# Patient Record
Sex: FEMALE | Race: BLACK OR AFRICAN AMERICAN | NOT HISPANIC OR LATINO | Employment: FULL TIME | URBAN - METROPOLITAN AREA
[De-identification: names, ages, dates, MRNs, and addresses within clinical notes are randomized per-mention and may not be internally consistent; named-entity substitution may affect disease eponyms.]

---

## 2020-09-01 ENCOUNTER — HOSPITAL ENCOUNTER (OUTPATIENT)
Dept: URGENT CARE | Facility: CLINIC | Age: 35
Discharge: HOME | End: 2020-09-01
Attending: FAMILY MEDICINE

## 2020-09-03 LAB — BACTERIA SPEC AEROBE CULT: NORMAL

## 2020-09-04 LAB — SARS-COV-2 RNA SPEC QL NAA+PROBE: NOT DETECTED

## 2021-01-18 ENCOUNTER — OFFICE VISIT CONVERTED (OUTPATIENT)
Dept: ORTHOPEDIC SURGERY | Facility: CLINIC | Age: 36
End: 2021-01-18
Attending: ORTHOPAEDIC SURGERY

## 2021-02-13 ENCOUNTER — HOSPITAL ENCOUNTER (OUTPATIENT)
Dept: URGENT CARE | Facility: CLINIC | Age: 36
Discharge: HOME | End: 2021-02-13
Attending: EMERGENCY MEDICINE
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
## 2021-05-14 VITALS — HEART RATE: 109 BPM | BODY MASS INDEX: 45.55 KG/M2 | OXYGEN SATURATION: 99 % | HEIGHT: 61 IN | WEIGHT: 241.25 LBS

## 2021-12-19 ENCOUNTER — HOSPITAL ENCOUNTER (EMERGENCY)
Facility: HOSPITAL | Age: 36
Discharge: HOME | End: 2021-12-19
Attending: EMERGENCY MEDICINE | Admitting: EMERGENCY MEDICINE

## 2021-12-19 VITALS
WEIGHT: 230.82 LBS | HEIGHT: 62 IN | RESPIRATION RATE: 19 BRPM | DIASTOLIC BLOOD PRESSURE: 97 MMHG | OXYGEN SATURATION: 99 % | BODY MASS INDEX: 42.48 KG/M2 | SYSTOLIC BLOOD PRESSURE: 128 MMHG | TEMPERATURE: 98.1 F | HEART RATE: 87 BPM

## 2021-12-19 DIAGNOSIS — S61.208A: Primary | ICD-10-CM

## 2021-12-19 PROCEDURE — 90471 IMMUNIZATION ADMIN: CPT | Performed by: NURSE PRACTITIONER

## 2021-12-19 PROCEDURE — 99283 EMERGENCY DEPT VISIT LOW MDM: CPT

## 2021-12-19 PROCEDURE — 90715 TDAP VACCINE 7 YRS/> IM: CPT | Performed by: NURSE PRACTITIONER

## 2021-12-19 PROCEDURE — 25010000002 TETANUS-DIPHTH-ACELL PERTUSSIS 5-2.5-18.5 LF-MCG/0.5 SUSPENSION PREFILLED SYRINGE: Performed by: NURSE PRACTITIONER

## 2021-12-19 RX ORDER — GINSENG 100 MG
CAPSULE ORAL ONCE
Status: COMPLETED | OUTPATIENT
Start: 2021-12-19 | End: 2021-12-19

## 2021-12-19 RX ADMIN — TETANUS TOXOID, REDUCED DIPHTHERIA TOXOID AND ACELLULAR PERTUSSIS VACCINE, ADSORBED 0.5 ML: 5; 2.5; 8; 8; 2.5 SUSPENSION INTRAMUSCULAR at 12:03

## 2021-12-19 RX ADMIN — Medication: at 12:27

## 2021-12-19 NOTE — ED PROVIDER NOTES
Subjective   Patient complaining of avulsion to the lateral side of her right index finger starting today.  Patient states she was utilizing a  at work when she accidentally cut her finger.  Patient denies any additional symptoms and or concerns at this time.      History provided by:  Patient   used: No    Laceration  Location:  Finger  Finger laceration location:  R index finger  Length:  Approximately 1 cm  Depth:  Cutaneous  Quality: avulsion    Bleeding: controlled    Time since incident: Today.  Laceration mechanism:  Knife (Patient states was utilizing a  when she cut her finger.)  Pain details:     Quality:  Dull    Timing:  Constant    Progression:  Unchanged  Foreign body present:  No foreign bodies  Relieved by:  Nothing  Worsened by:  Nothing  Ineffective treatments:  None tried  Tetanus status:  Out of date  Associated symptoms: no fever, no focal weakness, no numbness, no rash, no redness, no swelling and no streaking        Review of Systems   Constitutional: Negative for chills and fever.   HENT: Negative for congestion, ear pain and sore throat.    Eyes: Negative for pain.   Respiratory: Negative for cough, chest tightness and shortness of breath.    Cardiovascular: Negative for chest pain.   Gastrointestinal: Negative for abdominal pain, diarrhea, nausea and vomiting.   Genitourinary: Negative for flank pain and hematuria.   Musculoskeletal: Negative for joint swelling.        Patient complaining of avulsion to side of her right index finger starting today.  Patient was utilizing a  when she cut her finger.   Skin: Negative for pallor and rash.   Neurological: Negative for focal weakness, seizures and headaches.   Psychiatric/Behavioral: Negative.    All other systems reviewed and are negative.      Past Medical History:   Diagnosis Date   • Anemia    • Asthma    • Claustrophobia        No Known Allergies    History reviewed. No pertinent  surgical history.    Family History   Problem Relation Age of Onset   • Diabetes Mother    • Cancer Mother        Social History     Socioeconomic History   • Marital status: Single   Tobacco Use   • Smoking status: Current Every Day Smoker     Packs/day: 1.00   • Smokeless tobacco: Never Used   • Tobacco comment: SMOKED 5 YEARS   Substance and Sexual Activity   • Alcohol use: Yes     Comment: OCCASIONALLY   • Drug use: Never           Objective   Physical Exam  Vitals and nursing note reviewed.   Constitutional:       General: She is not in acute distress.     Appearance: Normal appearance. She is normal weight. She is not toxic-appearing.   HENT:      Head: Normocephalic and atraumatic.      Mouth/Throat:      Mouth: Mucous membranes are moist.   Eyes:      General: No scleral icterus.  Cardiovascular:      Rate and Rhythm: Normal rate and regular rhythm.      Pulses: Normal pulses.      Heart sounds: Normal heart sounds.   Pulmonary:      Effort: Pulmonary effort is normal. No respiratory distress.      Breath sounds: Normal breath sounds.   Abdominal:      General: Abdomen is flat.      Palpations: Abdomen is soft.      Tenderness: There is no abdominal tenderness.   Musculoskeletal:         General: Normal range of motion.        Arms:       Cervical back: Normal range of motion and neck supple.   Skin:     General: Skin is warm and dry.   Neurological:      General: No focal deficit present.      Mental Status: She is alert and oriented to person, place, and time. Mental status is at baseline.   Psychiatric:         Mood and Affect: Mood normal.         Behavior: Behavior normal.         Thought Content: Thought content normal.         Judgment: Judgment normal.         Procedures           ED Course                                                 MDM  Number of Diagnoses or Management Options  Diagnosis management comments: I have spoken with Ms. Dunne. I have explained the patient´s condition, diagnoses  and treatment plan based on the information available to me at this time. I have answered the patient questions and addressed any concerns. The patient has a good  understanding of the patient´s diagnosis, condition, and treatment plan as can be expected at this point. The vital signs have been stable. The patient´s condition is stable and appropriate for discharge from the emergency department.      The patient will pursue further outpatient evaluation with the primary care physician or other designated or consulting physician as outlined in the discharge instructions. They are agreeable to this plan of care and follow-up instructions have been explained in detail. The patient has received these instructions in written format and have expressed an understanding of the discharge instructions. The patient is aware that any significant change in condition or worsening of symptoms should prompt an immediate return to this or the closest emergency department or call to 1.    Risk of Complications, Morbidity, and/or Mortality  Presenting problems: low  Diagnostic procedures: low  Management options: low    Patient Progress  Patient progress: stable      Final diagnoses:   Avulsion of skin of index finger without complication, initial encounter       ED Disposition  ED Disposition     ED Disposition Condition Comment    Discharge Stable           [facility]  [address] [facility] [address]  [facility] [address]  [phone]  Schedule an appointment as soon as possible for a visit in 3 days           Medication List      New Prescriptions    diclofenac 50 MG EC tablet  Commonly known as: VOLTAREN  Take 1 tablet by mouth 3 (Three) Times a Day.           Where to Get Your Medications      These medications were sent to [facility]/pharmacy #[id number] - [place], [place] - [address] - [phone] [facility] [phone] FX  [address] [place] [address]    Hours: 24-hours Phone: [phone]    · diclofenac 50 MG EC tablet          Royal Brown, APRN  12/19/21 1400

## 2023-10-02 ENCOUNTER — HOSPITAL ENCOUNTER (INPATIENT)
Facility: HOSPITAL | Age: 38
LOS: 1 days | Discharge: HOME | DRG: 812 | End: 2023-10-03
Attending: EMERGENCY MEDICINE
Payer: MEDICAID

## 2023-10-02 ENCOUNTER — APPOINTMENT (OUTPATIENT)
Dept: GENERAL RADIOLOGY | Facility: HOSPITAL | Age: 38
DRG: 812 | End: 2023-10-02
Payer: MEDICAID

## 2023-10-02 ENCOUNTER — APPOINTMENT (OUTPATIENT)
Dept: ULTRASOUND IMAGING | Facility: HOSPITAL | Age: 38
DRG: 812 | End: 2023-10-02
Payer: MEDICAID

## 2023-10-02 DIAGNOSIS — N93.9 VAGINAL BLEEDING: Primary | ICD-10-CM

## 2023-10-02 DIAGNOSIS — D64.9 SYMPTOMATIC ANEMIA: ICD-10-CM

## 2023-10-02 LAB
ABO GROUP BLD: NORMAL
ABO GROUP BLD: NORMAL
ALBUMIN SERPL-MCNC: 4 G/DL (ref 3.5–5.2)
ALBUMIN/GLOB SERPL: 1.2 G/DL
ALP SERPL-CCNC: 77 U/L (ref 39–117)
ALT SERPL W P-5'-P-CCNC: 11 U/L (ref 1–33)
ANION GAP SERPL CALCULATED.3IONS-SCNC: 10.3 MMOL/L (ref 5–15)
ANISOCYTOSIS BLD QL: NORMAL
AST SERPL-CCNC: 12 U/L (ref 1–32)
BASOPHILS # BLD AUTO: 0.03 10*3/MM3 (ref 0–0.2)
BASOPHILS NFR BLD AUTO: 0.3 % (ref 0–1.5)
BILIRUB SERPL-MCNC: 0.3 MG/DL (ref 0–1.2)
BLD GP AB SCN SERPL QL: NEGATIVE
BUN SERPL-MCNC: 12 MG/DL (ref 6–20)
BUN/CREAT SERPL: 14 (ref 7–25)
CALCIUM SPEC-SCNC: 9.1 MG/DL (ref 8.6–10.5)
CHLORIDE SERPL-SCNC: 101 MMOL/L (ref 98–107)
CO2 SERPL-SCNC: 23.7 MMOL/L (ref 22–29)
CREAT SERPL-MCNC: 0.86 MG/DL (ref 0.57–1)
DEPRECATED RDW RBC AUTO: 43.6 FL (ref 37–54)
EGFRCR SERPLBLD CKD-EPI 2021: 89.9 ML/MIN/1.73
EOSINOPHIL # BLD AUTO: 0.13 10*3/MM3 (ref 0–0.4)
EOSINOPHIL NFR BLD AUTO: 1.4 % (ref 0.3–6.2)
ERYTHROCYTE [DISTWIDTH] IN BLOOD BY AUTOMATED COUNT: 20.9 % (ref 12.3–15.4)
GLOBULIN UR ELPH-MCNC: 3.4 GM/DL
GLUCOSE SERPL-MCNC: 94 MG/DL (ref 65–99)
HCG SERPL QL: NEGATIVE
HCT VFR BLD AUTO: 19.9 % (ref 34–46.6)
HGB BLD-MCNC: 5.3 G/DL (ref 12–15.9)
HOLD SPECIMEN: NORMAL
HOLD SPECIMEN: NORMAL
HYPOCHROMIA BLD QL: NORMAL
IMM GRANULOCYTES # BLD AUTO: 0.04 10*3/MM3 (ref 0–0.05)
IMM GRANULOCYTES NFR BLD AUTO: 0.4 % (ref 0–0.5)
LYMPHOCYTES # BLD AUTO: 1.9 10*3/MM3 (ref 0.7–3.1)
LYMPHOCYTES NFR BLD AUTO: 20.4 % (ref 19.6–45.3)
MCH RBC QN AUTO: 16.1 PG (ref 26.6–33)
MCHC RBC AUTO-ENTMCNC: 26.6 G/DL (ref 31.5–35.7)
MCV RBC AUTO: 60.3 FL (ref 79–97)
MICROCYTES BLD QL: NORMAL
MONOCYTES # BLD AUTO: 0.75 10*3/MM3 (ref 0.1–0.9)
MONOCYTES NFR BLD AUTO: 8.1 % (ref 5–12)
NEUTROPHILS NFR BLD AUTO: 6.46 10*3/MM3 (ref 1.7–7)
NEUTROPHILS NFR BLD AUTO: 69.4 % (ref 42.7–76)
NRBC BLD AUTO-RTO: 0.5 /100 WBC (ref 0–0.2)
OVALOCYTES BLD QL SMEAR: NORMAL
PLATELET # BLD AUTO: 610 10*3/MM3 (ref 140–450)
PMV BLD AUTO: 9.3 FL (ref 6–12)
POIKILOCYTOSIS BLD QL SMEAR: NORMAL
POTASSIUM SERPL-SCNC: 3.5 MMOL/L (ref 3.5–5.2)
PROT SERPL-MCNC: 7.4 G/DL (ref 6–8.5)
RBC # BLD AUTO: 3.3 10*6/MM3 (ref 3.77–5.28)
RH BLD: POSITIVE
RH BLD: POSITIVE
SMALL PLATELETS BLD QL SMEAR: NORMAL
SODIUM SERPL-SCNC: 135 MMOL/L (ref 136–145)
T&S EXPIRATION DATE: NORMAL
TARGETS BLD QL SMEAR: NORMAL
WBC MORPH BLD: NORMAL
WBC NRBC COR # BLD: 9.31 10*3/MM3 (ref 3.4–10.8)
WHOLE BLOOD HOLD COAG: NORMAL
WHOLE BLOOD HOLD SPECIMEN: NORMAL

## 2023-10-02 PROCEDURE — 36415 COLL VENOUS BLD VENIPUNCTURE: CPT

## 2023-10-02 PROCEDURE — 85007 BL SMEAR W/DIFF WBC COUNT: CPT

## 2023-10-02 PROCEDURE — 86850 RBC ANTIBODY SCREEN: CPT | Performed by: EMERGENCY MEDICINE

## 2023-10-02 PROCEDURE — 84703 CHORIONIC GONADOTROPIN ASSAY: CPT | Performed by: EMERGENCY MEDICINE

## 2023-10-02 PROCEDURE — 99285 EMERGENCY DEPT VISIT HI MDM: CPT

## 2023-10-02 PROCEDURE — 99222 1ST HOSP IP/OBS MODERATE 55: CPT | Performed by: FAMILY MEDICINE

## 2023-10-02 PROCEDURE — 76830 TRANSVAGINAL US NON-OB: CPT

## 2023-10-02 PROCEDURE — 86901 BLOOD TYPING SEROLOGIC RH(D): CPT

## 2023-10-02 PROCEDURE — 86901 BLOOD TYPING SEROLOGIC RH(D): CPT | Performed by: EMERGENCY MEDICINE

## 2023-10-02 PROCEDURE — 86900 BLOOD TYPING SEROLOGIC ABO: CPT | Performed by: EMERGENCY MEDICINE

## 2023-10-02 PROCEDURE — 86900 BLOOD TYPING SEROLOGIC ABO: CPT

## 2023-10-02 PROCEDURE — 71045 X-RAY EXAM CHEST 1 VIEW: CPT

## 2023-10-02 PROCEDURE — 85025 COMPLETE CBC W/AUTO DIFF WBC: CPT

## 2023-10-02 PROCEDURE — 36430 TRANSFUSION BLD/BLD COMPNT: CPT

## 2023-10-02 PROCEDURE — P9016 RBC LEUKOCYTES REDUCED: HCPCS

## 2023-10-02 PROCEDURE — 86923 COMPATIBILITY TEST ELECTRIC: CPT

## 2023-10-02 PROCEDURE — 80053 COMPREHEN METABOLIC PANEL: CPT

## 2023-10-02 RX ORDER — NITROGLYCERIN 0.4 MG/1
0.4 TABLET SUBLINGUAL
Status: DISCONTINUED | OUTPATIENT
Start: 2023-10-02 | End: 2023-10-03 | Stop reason: HOSPADM

## 2023-10-02 RX ORDER — SODIUM CHLORIDE 0.9 % (FLUSH) 0.9 %
10 SYRINGE (ML) INJECTION EVERY 12 HOURS SCHEDULED
Status: DISCONTINUED | OUTPATIENT
Start: 2023-10-03 | End: 2023-10-03 | Stop reason: HOSPADM

## 2023-10-02 RX ORDER — SODIUM CHLORIDE 0.9 % (FLUSH) 0.9 %
10 SYRINGE (ML) INJECTION AS NEEDED
Status: DISCONTINUED | OUTPATIENT
Start: 2023-10-02 | End: 2023-10-03 | Stop reason: HOSPADM

## 2023-10-02 RX ORDER — POLYETHYLENE GLYCOL 3350 17 G/17G
17 POWDER, FOR SOLUTION ORAL DAILY PRN
Status: DISCONTINUED | OUTPATIENT
Start: 2023-10-02 | End: 2023-10-03 | Stop reason: HOSPADM

## 2023-10-02 RX ORDER — BISACODYL 5 MG/1
5 TABLET, DELAYED RELEASE ORAL DAILY PRN
Status: DISCONTINUED | OUTPATIENT
Start: 2023-10-02 | End: 2023-10-03 | Stop reason: HOSPADM

## 2023-10-02 RX ORDER — AMOXICILLIN 250 MG
2 CAPSULE ORAL 2 TIMES DAILY
Status: DISCONTINUED | OUTPATIENT
Start: 2023-10-03 | End: 2023-10-03 | Stop reason: HOSPADM

## 2023-10-02 RX ORDER — SODIUM CHLORIDE 9 MG/ML
40 INJECTION, SOLUTION INTRAVENOUS AS NEEDED
Status: DISCONTINUED | OUTPATIENT
Start: 2023-10-02 | End: 2023-10-03 | Stop reason: HOSPADM

## 2023-10-02 RX ORDER — BISACODYL 10 MG
10 SUPPOSITORY, RECTAL RECTAL DAILY PRN
Status: DISCONTINUED | OUTPATIENT
Start: 2023-10-02 | End: 2023-10-03 | Stop reason: HOSPADM

## 2023-10-02 RX ADMIN — Medication 10 ML: at 23:30

## 2023-10-02 NOTE — ED NOTES
Pt states that she has been experiencing lightheadedness for several months. Pt denies HA, N/V, and chest pain. Pt reports SOB. Pt states her menstrual cycle has been constant and heavy since June 8

## 2023-10-02 NOTE — ED PROVIDER NOTES
"Time: 3:35 PM EDT  Date of encounter:  10/2/2023  Independent Historian/Clinical History and Information was obtained by:   Patient    History is limited by: N/A    Chief Complaint   Patient presents with    Dizziness    Syncope    Vaginal Bleeding    Shortness of Breath         History of Present Illness:  Patient is a 38 y.o. year old female who presents to the emergency department for evaluation of menorrhagia.  Episodes of dizziness and syncope.  Patient has a history of anemia.    Patient is a 38-year-old female who presents with complaints of lightheaded, shortness of breath, fatigue, syncope.  Reports that she is gotten worse over the last few weeks.  Has had a ongoing menstrual cycle since June.  Has not seen OB/GYN for this.  States she she has heavy bleeding and clots.  Denies any chest pain, nausea, vomiting.  No other complaints this time.    HPI    Patient Care Team  Primary Care Provider: Provider, No Known    Past Medical History:     No Known Allergies  Past Medical History:   Diagnosis Date    Anemia     Asthma     Claustrophobia      History reviewed. No pertinent surgical history.  Family History   Problem Relation Age of Onset    Diabetes Mother     Cancer Mother        Home Medications:  Prior to Admission medications    Not on File        Social History:   Social History     Tobacco Use    Smoking status: Every Day     Packs/day: 1.00     Types: Cigarettes    Smokeless tobacco: Never    Tobacco comments:     SMOKED 5 YEARS   Vaping Use    Vaping Use: Never used   Substance Use Topics    Alcohol use: Yes     Comment: OCCASIONALLY    Drug use: Never         Review of Systems:  Review of Systems   Genitourinary:  Positive for vaginal bleeding.   Neurological:  Positive for dizziness, syncope and light-headedness.      Physical Exam:  /87   Pulse 79   Temp 97.8 °F (36.6 °C) (Oral) Comment: pt is eating ice  Resp 17   Ht 154.9 cm (61\")   Wt 103 kg (227 lb 4.7 oz)   SpO2 100%   BMI 42.95 " kg/m²         Physical Exam  Vitals and nursing note reviewed.   Constitutional:       Appearance: Normal appearance.   HENT:      Head: Normocephalic and atraumatic.      Mouth/Throat:      Mouth: Mucous membranes are moist.   Eyes:      General: No scleral icterus.     Pupils: Pupils are equal, round, and reactive to light.   Cardiovascular:      Rate and Rhythm: Normal rate and regular rhythm.   Pulmonary:      Effort: Pulmonary effort is normal.      Breath sounds: Normal breath sounds.   Abdominal:      General: There is no distension.      Palpations: Abdomen is soft.      Tenderness: There is no abdominal tenderness.   Genitourinary:     Comments: There are clots within the vaginal vault.  There is no significant bleeding at this time.  No cervical motion tenderness no adnexal tenderness.  Musculoskeletal:         General: Normal range of motion.      Cervical back: Normal range of motion and neck supple.   Skin:     General: Skin is warm and dry.      Findings: No rash.   Neurological:      General: No focal deficit present.      Mental Status: She is alert and oriented to person, place, and time.   Psychiatric:         Mood and Affect: Mood normal.         Behavior: Behavior normal.                    Procedures:  Procedures      Medical Decision Making:      Comorbidities that affect care:    Anemia    External Notes reviewed:    Reviewed urgent care note from 1/28/2023      The following orders were placed and all results were independently analyzed by me:  Orders Placed This Encounter   Procedures    XR Chest 1 View    US Pelvis Complete    Treichlers Draw    Comprehensive Metabolic Panel    CBC Auto Differential    Scan Slide    hCG, Serum, Qualitative    NPO Diet NPO Type: Strict NPO    Undress & Gown    Continuous Pulse Oximetry    Vital Signs    Orthostatic Blood Pressure    Verify Informed Consent    IP Consult to OB GYN    Inpatient Hospitalist Consult    Oxygen Therapy- Nasal Cannula; Titrate 1-6 LPM  Per SpO2; 90 - 95%    Type & Screen    ABO RH Specimen Verification    Prepare RBC, 2 Units    Insert Peripheral IV    Fall Precautions    CBC & Differential    Green Top (Gel)    Lavender Top    Gold Top - SST    Light Blue Top       Medications Given in the Emergency Department:  Medications   sodium chloride 0.9 % flush 10 mL (has no administration in time range)        ED Course:    The patient was initially evaluated in the triage area where orders were placed. The patient was later dispositioned by Titus Uribe MD.      The patient was advised to stay for completion of workup which includes but is not limited to communication of labs and radiological results, reassessment and plan. The patient was advised that leaving prior to disposition by a provider could result in critical findings that are not communicated to the patient.     ED Course as of 10/02/23 2213   Mon Oct 02, 2023   1536 --- PROVIDER IN TRIAGE NOTE ---    The patient was evaluated by Zulema sandy in triage. Orders were placed and the patient is currently awaiting disposition.    [AJ]   2144 Spoke with Dr. Corcoran from OB/GYN who will consult [MA]   2211 Spoke with Dr. Madrigal who agrees to admit [MA]      ED Course User Index  [AJ] Zulema Valera PA-C  [MA] Titus Uribe MD       Labs:    Lab Results (last 24 hours)       Procedure Component Value Units Date/Time    CBC & Differential [[id number]]  (Abnormal) Collected: 10/02/23 1546    Specimen: Blood from Arm, Right Updated: 10/02/23 1625    Narrative:      The following orders were created for panel order CBC & Differential.  Procedure                               Abnormality         Status                     ---------                               -----------         ------                     CBC Auto Differential[[id number]]        Abnormal            Final result               Scan Slide[[id number]]                                       Final result                  Please view results for these tests on the individual orders.    Comprehensive Metabolic Panel [[id number]]  (Abnormal) Collected: 10/02/23 1546    Specimen: Blood from Arm, Right Updated: 10/02/23 1615     Glucose 94 mg/dL      BUN 12 mg/dL      Creatinine 0.86 mg/dL      Sodium 135 mmol/L      Potassium 3.5 mmol/L      Chloride 101 mmol/L      CO2 23.7 mmol/L      Calcium 9.1 mg/dL      Total Protein 7.4 g/dL      Albumin 4.0 g/dL      ALT (SGPT) 11 U/L      AST (SGOT) 12 U/L      Alkaline Phosphatase 77 U/L      Total Bilirubin 0.3 mg/dL      Globulin 3.4 gm/dL      A/G Ratio 1.2 g/dL      BUN/Creatinine Ratio 14.0     Anion Gap 10.3 mmol/L      eGFR 89.9 mL/min/1.73     Narrative:      GFR Normal >60  Chronic Kidney Disease <60  Kidney Failure <15      CBC Auto Differential [[id number]]  (Abnormal) Collected: 10/02/23 1546    Specimen: Blood from Arm, Right Updated: 10/02/23 1601     WBC 9.31 10*3/mm3      RBC 3.30 10*6/mm3      Hemoglobin 5.3 g/dL      Hematocrit 19.9 %      MCV 60.3 fL      MCH 16.1 pg      MCHC 26.6 g/dL      RDW 20.9 %      RDW-SD 43.6 fl      MPV 9.3 fL      Platelets 610 10*3/mm3      Neutrophil % 69.4 %      Lymphocyte % 20.4 %      Monocyte % 8.1 %      Eosinophil % 1.4 %      Basophil % 0.3 %      Immature Grans % 0.4 %      Neutrophils, Absolute 6.46 10*3/mm3      Lymphocytes, Absolute 1.90 10*3/mm3      Monocytes, Absolute 0.75 10*3/mm3      Eosinophils, Absolute 0.13 10*3/mm3      Basophils, Absolute 0.03 10*3/mm3      Immature Grans, Absolute 0.04 10*3/mm3      nRBC 0.5 /100 WBC     Scan Slide [[id number]] Collected: 10/02/23 1546    Specimen: Blood from Arm, Right Updated: 10/02/23 1625     Anisocytosis Mod/2+     Hypochromia Mod/2+     Microcytes Mod/2+     Ovalocytes Slight/1+     Poikilocytes Mod/2+     Target Cells Slight/1+     WBC Morphology Normal     Platelet Estimate Increased    hCG, Serum, Qualitative [[id number]]  (Normal) Collected: 10/02/23 2103    Specimen: Blood  Updated: 10/02/23 2122     HCG Qualitative Negative    Narrative:      Sensitive immunoassays may demonstrate false positive results  with specimens containing heterophilic antibodies. Assays may  also exhibit false-positive or false-negative results with  specimens containing human anti-mouse antibodies. These   specimens may come from patients receiving preparations of  mouse monoclonal antibodies for diagnosis or therapy or having  been exposed to mice. If the qualitative interpretation is  inconsistent with the clinical evaluation, results should be   confirmed by an alternate hCG method, ie. quantitative hCG.             Imaging:    XR Chest 1 View    Result Date: 10/2/2023  PROCEDURE: XR CHEST 1 VW  COMPARISON: [facility], , CHEST PA/AP & LAT 2V, 7/08/2018, 22:19.  INDICATIONS: cough/sob/Syncope since June 8th  FINDINGS:  LUNGS: Normal.  No significant pulmonary parenchymal abnormalities.  VASCULATURE: Normal.  Unremarkable pulmonary vasculature.  CARDIAC: Normal.  No cardiac silhouette abnormality or cardiomegaly.  MEDIASTINUM: Normal.  No visible mass or adenopathy.  PLEURA: Normal.  No effusion or pleural thickening.  BONES: Normal.  No fracture or visible bony lesion.  OTHER: Negative.        No acute cardiopulmonary process identified.       ADRIANA CEE MD       Electronically Signed and Approved By: ADRIANA CEE MD on 10/02/2023 at 16:46                Differential Diagnosis and Discussion:      Syncope: Differential diagnosis includes but is not limited to TIA, hyperventilation, aortic stenosis, pulmonary emboli, myocardial disease, bradycardia arrhythmia, heart block, tachyarrhythmia, vasovagal, orthostatic hypotension, ruptured AAA, aortic dissection, subarachnoid hemorrhage, seizure, hypoglycemia.    All labs were reviewed and interpreted by me.    MDM       Patient is a 38-year-old female who presents with vaginal bleeding.  Found to have symptomatic anemia with a syncopal  episode prior as well as dyspnea on exertion.  Hemoglobin of 5.3.  Given blood.  Will admit to the hospital for further work-up and management.    Critical Care Note: Total Critical Care time of 33 minutes. Total critical care time documented does not include time spent on separately billed procedures for services of nurses or physician assistants. I personally saw and examined the patient. I have reviewed all diagnostic interpretations and treatment plans as written. I was present for the key portions of any procedures performed and the inclusive time noted in any critical care statement. Critical care time includes patient management by me, time spent at the patients bedside,  time to review lab and imaging results, discussing patient care, documentation in the medical record, and time spent with family or caregiver.    Patient Care Considerations:          Consultants/Shared Management Plan:    Hospitalist: I have discussed the case with Dr. Madrigal who agrees to accept the patient for admission.  Consultant: I have discussed the case with Dr. Purvis who agrees to consult on the patient.    Social Determinants of Health:          Disposition and Care Coordination:    Admit:   Through independent evaluation of the patient's history, physical, and imperical data, the patient meets criteria for observation/admission to the hospital.        Final diagnoses:   Vaginal bleeding   Symptomatic anemia        ED Disposition       ED Disposition   Decision to Admit    Condition   --    Comment   --               This medical record created using voice recognition software.             Titus Uribe MD  10/02/23 2527

## 2023-10-03 ENCOUNTER — READMISSION MANAGEMENT (OUTPATIENT)
Dept: CALL CENTER | Facility: HOSPITAL | Age: 38
End: 2023-10-03
Payer: MEDICAID

## 2023-10-03 VITALS
TEMPERATURE: 98.6 F | HEART RATE: 85 BPM | RESPIRATION RATE: 16 BRPM | DIASTOLIC BLOOD PRESSURE: 76 MMHG | SYSTOLIC BLOOD PRESSURE: 139 MMHG | OXYGEN SATURATION: 100 % | BODY MASS INDEX: 43.58 KG/M2 | WEIGHT: 230.82 LBS | HEIGHT: 61 IN

## 2023-10-03 PROBLEM — N92.0 MENORRHAGIA: Status: ACTIVE | Noted: 2023-10-03

## 2023-10-03 PROBLEM — F17.210 CIGARETTE SMOKER: Status: ACTIVE | Noted: 2023-10-03

## 2023-10-03 PROBLEM — J45.909 ASTHMA: Status: ACTIVE | Noted: 2023-10-03

## 2023-10-03 PROBLEM — N93.9 VAGINAL BLEEDING: Status: ACTIVE | Noted: 2023-10-03

## 2023-10-03 PROBLEM — D21.9 FIBROID: Status: ACTIVE | Noted: 2023-10-03

## 2023-10-03 LAB
HCT VFR BLD AUTO: 28.9 % (ref 34–46.6)
HGB BLD-MCNC: 8.5 G/DL (ref 12–15.9)

## 2023-10-03 PROCEDURE — 85018 HEMOGLOBIN: CPT | Performed by: FAMILY MEDICINE

## 2023-10-03 PROCEDURE — 96372 THER/PROPH/DIAG INJ SC/IM: CPT

## 2023-10-03 PROCEDURE — 85014 HEMATOCRIT: CPT | Performed by: FAMILY MEDICINE

## 2023-10-03 RX ORDER — MEDROXYPROGESTERONE ACETATE 150 MG/ML
150 INJECTION, SUSPENSION INTRAMUSCULAR ONCE
Status: DISCONTINUED | OUTPATIENT
Start: 2023-10-03 | End: 2023-10-03

## 2023-10-03 RX ORDER — MEDROXYPROGESTERONE ACETATE 150 MG/ML
150 INJECTION, SUSPENSION INTRAMUSCULAR ONCE
Status: COMPLETED | OUTPATIENT
Start: 2023-10-03 | End: 2023-10-03

## 2023-10-03 RX ORDER — FERROUS SULFATE 325(65) MG
325 TABLET ORAL
Qty: 30 TABLET | Refills: 0 | Status: SHIPPED | OUTPATIENT
Start: 2023-10-03

## 2023-10-03 RX ADMIN — Medication 10 ML: at 13:26

## 2023-10-03 RX ADMIN — MEDROXYPROGESTERONE ACETATE 150 MG: 150 INJECTION, SUSPENSION INTRAMUSCULAR at 13:26

## 2023-10-03 NOTE — PLAN OF CARE
Goal Outcome Evaluation:                      Patient alert and oriented x 4, pleasant interaction with voracious appetite. Provided meal on arrival to floor. Presents with significant other and sister, reports children at home, significant other did return home and sister remained at bedside. Patient does not report pain at this time but does report vaginal bleeding with clotting, continuous since July 2023. Started second of two units of blood on arrival to floor. Completed. Hg was 5.3 now 8.5.

## 2023-10-03 NOTE — DISCHARGE SUMMARY
Date of admission: 10/2/23    Admission diagnosis: Symptomatic anemia.  Metrorrhagia.    Admitting physician: Lobo Madrigal DO    Date of discharge: 10/3/2023    Discharge diagnosis: Same    Discharge physician: Arturo Kent MD    Discharge condition: Stable    Disposition: Discharge home    Hospital course and discharge exam:  Patient is a 38-year-old female who presented emergency department with complaint of lightheadedness, shortness of breath, fatigue and syncope.  She she reported having vaginal bleeding since June of this year.  She reported history of on and off heavy cycles.  She was found to be severely anemic.  Ultrasound was performed with basically normal fat except for small fibroid.  She was admitted and received 2 units of packed red blood cells.  I saw the patient and discussed management options including Depo-Provera and endometrial ablation versus hysterectomy.  Patient reported no pregnancy desires.  She was agreeable to receive Depo-Provera 150 mg IM but desires an endometrial ablation in the future.  I discussed patient with Dr. Fairbanks who is the backup for the OB hospitalist for today and she agreed to see the patient.  Patient currently feels better than at time of admission.  She denies any lightheadedness or shortness of breath.  At the time of exam:  She is afebrile vital signs stable  Lungs are clear to auscultation bilaterally  Heart is regular rhythm  Abdomen soft nontender    Patient will be discharged home on iron supplementation.  She will follow-up with Dr. Fairbanks to discuss future management options.

## 2023-10-03 NOTE — PLAN OF CARE
"Goal Outcome Evaluation:  Plan of Care Reviewed With: patient         Pt alert and oriented. Pt spoke with Dr. Kent today about options. Decided to have depo shot today and schedule outpatient ablasion. Pt stated her abdomen was feeling \"weird\", denied pain, but said it just didn't feel normal. She said she talked to md about it. Pt is being discharged home to be followed up with OB/gyn for ablasion.             "

## 2023-10-03 NOTE — PROGRESS NOTES
"GYN Visit    Chief Complaint   Patient presents with    Follow-up       HPI:   38 y.o.No obstetric history on file. LMP: Patient's last menstrual period was 07/08/2023 (exact date).     Social History     Substance and Sexual Activity   Sexual Activity Yes    Partners: Female    Birth control/protection: Depo-provera     Hemoglobin & Hematocrit, Blood (10/03/2023 03:19)    hCG, Serum, Qualitative (10/02/2023 21:03)    ED to Hosp-Admission (Discharged) with Arturo Kent MD; Titus Uribe MD (10/02/2023)    US Non-ob Transvaginal (10/02/2023 22:20)US Non-ob Transvaginal (10/02/2023     Admitted [facility] for lightheadedness and dizziness, bleeding vaginally since July, Hct 19, given 2U PRBC and DepoProvera 150mg IM..  Hcg neg.  US cw 1.2cm intramural post lateral fibroid.    Pt in same sex relationship, desires endometrial ablation    Bleeding better now since DepoProvera given at [facility].    Over last 6mo menses:   q 1-3mo, lasts 4-12 days, changes products q 30min on heaviest days.     ROS:  No hx of bleeding easily, No Fhx bleeding do, Has had PCOS sxs irreg menses, shaves and plucks face, chest for years    History: PMHx, Meds, Allergies, PSHx, Social Hx, and POBHx all reviewed and updated.    PHYSICAL EXAM:  /75   Pulse 97   Ht 154.9 cm (60.98\")   Wt 104 kg (230 lb)   LMP 07/08/2023 (Exact Date) Comment: never stoped  BMI 43.48 kg/m²   Facility age limit for growth percentiles is 20 years.  General- NAD, alert and oriented, appropriate  Psych- Normal mood, good memory    Cardiovascular- Regular rhythm, no murnurs  Respiratory- CTA to bases, no wheezes  Abdomen- Soft, non distended, non tender, no masses    External genitalia- Normal female, no lesions  Urethra/meatus- Normal, no masses, non tender, no prolapse  Bladder- Normal, no masses, non tender, no prolapse  Vagina- Normal, no atrophy, no lesions, no discharge, no prolapse  Cervix- Normal, no lesions, no discharge, No cervical motion " tenderness  Uterus- Normal size, slightly irreg shape c/w small fibroid & consistency.  Non tender, mobile, & no prolapse   Adnexa- No mass, non tender  Anus/Rectum/Perineum- Not performed    Lymphatic- No palpable groin nodes  Extremities- No edema, no cyanosis    Skin- No lesions, no rashes      ASSESSMENT AND PLAN:  Diagnoses and all orders for this visit:    1. Menorrhagia with irregular cycle (Primary)  Overview:  Oct 2023, hct 28 (after 2U PRBC), US 1.2cm intramural fibroid    Orders:  -     Prolactin  -     TSH  -     T4, Free    2. Fibroid  Overview:  Oct 2023 1.2cm intramural, post/lateral      3. Iron deficiency anemia due to chronic blood loss  Overview:  2u PRBC Oct 2, 2023      4. PCOS (polycystic ovarian syndrome)  Overview:  Chronic anovulation and clinical hirsutism    Orders:  -     17-Hydroxyprogesterone  -     Comprehensive Metabolic Panel  -     DHEA-Sulfate  -     Testosterone, Bioavailable (M)  -     Insulin, Total; Future  -     Lipid Panel  -     Insulin, Total    5. Female hirsutism  -     17-Hydroxyprogesterone  -     DHEA-Sulfate  -     Testosterone, Bioavailable (M)    6. Screening for cervical cancer  -     IgP, Aptima HPV    7. Cigarette smoker  Comments:  Limits treatment options to progestin only    8. Screen for STD (sexually transmitted disease)  -     Gardnerella vaginalis, Trichomonas vaginalis, Candida albicans, DNA - Swab, Vagina  -     Chlamydia trachomatis, Neisseria gonorrhoeae, PCR - Swab, Cervix      Options to include risks, benefits, alternatives, side effects, efficacy: hormonal-progestin only, Mirena IUD (input provided), Depo, out pt OR D+C H/S myosure, hyst, do not rec ablation w PCOS to chronic anovulation and increased risk of endometrial malignancy.    FIBROIDS- Discussed Dx, incidence, symptoms, treatment options     PCOS- Discussed Dx, Tx, weight, pregnancy, periods/anovulation, cholesterol, insulin, and uterine cancer risk discussed w pt.    Follow Up:  Return  in about 2 weeks (around 10/19/2023) for EMBx and FU labs.          Yoselin Fairbanks, DO  10/05/2023    [facility]  [facility] [place] [address]  Dept: [phone]  Dept Fax: [phone]  Loc: [phone]  Loc Fax: [phone]

## 2023-10-03 NOTE — H&P
[facility]   HISTORY AND PHYSICAL    Patient Name: Marilee Dunne  : 1985  MRN: [id number]  Primary Care Physician:  Estela, No Known  Date of admission: 10/2/2023    Subjective   Subjective     Chief Complaint: Fatigue    History of Present Illness  Patient is a 38-year-old female who presents to the emergency department with a 4-month history of dizziness, fatigue, weakness and occasional shortness of breath.  Patient says that since  she has had consistent heavy menstrual flow.  She has received and sought no treatment.  She comes into the ER today for evaluation.  Here in the ER her hemoglobin is 5.3  Review of Systems   Constitutional:  Positive for activity change and fatigue.   Genitourinary:  Positive for menstrual problem.      Personal History     Past Medical History:   Diagnosis Date    Anemia     Asthma     Claustrophobia        History reviewed. No pertinent surgical history.    Family History: family history includes Cancer in her mother; Diabetes in her mother. Otherwise pertinent FHx was reviewed and not pertinent to current issue.    Social History:  reports that she has been smoking cigarettes. She has been smoking an average of 1 pack per day. She has never used smokeless tobacco. She reports current alcohol use. She reports that she does not use drugs.    Home Medications:       Allergies:  No Known Allergies    Objective    Objective     Vitals:   Temp:  [97.8 °F (36.6 °C)-98.4 °F (36.9 °C)] 97.8 °F (36.6 °C)  Heart Rate:  [] 79  Resp:  [17-23] 17  BP: (125-142)/(61-87) 140/87    Physical Exam  Constitutional: Patient is very pale but well-developed and well-nourished.  No acute distress.      HENT:  Head:  Normocephalic and atraumatic.  Mouth:  Moist mucous membranes.    Eyes:  Conjunctivae and EOM are normal. No scleral icterus.    Neck:  Neck supple.  No JVD present.    Cardiovascular:  Normal rate, regular rhythm and normal heart sounds with no  murmur.  Pulmonary/Chest:  No respiratory distress, no wheezes, no crackles, with normal breath sounds and good air movement.  Abdominal:  Soft. No distension and no tenderness.   Musculoskeletal:  No tenderness, and no deformity.  No red or swollen joints anywhere.    Neurological:  Alert and oriented to person, place, and time.  No cranial nerve deficit.    Skin:  Skin is warm and dry. No rash noted. No pallor.   Peripheral vascular:  No clubbing, no cyanosis, no edema.  Psychiatric: Appropriate mood and affect  : Furred  Result Review    Result Review:  I have personally reviewed the results from the time of this admission to 10/2/2023 22:02 EDT and agree with these findings:  [x]  Laboratory list / accordion  []  Microbiology  [x]  Radiology  []  EKG/Telemetry   []  Cardiology/Vascular   []  Pathology  []  Old records  []  Other:  Most notable findings include:   Results from last 7 days   Lab Units 10/02/23  1546   WBC 10*3/mm3 9.31   HEMOGLOBIN g/dL 5.3*   HEMATOCRIT % 19.9*   PLATELETS 10*3/mm3 610*          Assessment & Plan   Assessment / Plan     Brief Patient Summary:  Marilee Dunne is a 38 y.o. female who presents with weakness and dizziness.  She is found to have a hemoglobin of 5.3.  Upon questioning appears that the patient has had heavy menstrual flow since June 8.  We will admit for transfusion and GYN evaluation    Active Hospital Problems:  1.  Symptomatic severe anemia  2.  Metomenorrhalgia    Plan:   Patient will be admitted to the hospital service  Patient's case is already been discussed with gynecology who will see in consultation  GYN asked for an ultrasound of the pelvis  We will type cross and transfuse the patient at least 2 units overnight however I feel that she will probably need 2 more units during the hospitalization  We will keep her on telemetry for closer monitoring    DVT prophylaxis:  No DVT prophylaxis order currently exists.    CODE STATUS:       Admission Status:  I  believe this patient meets inpatient status.    Lobo Madrigal, DO

## 2023-10-03 NOTE — CONSULTS
Referring Provider: Lobo Madrigal DO.  Reason for Consultation: Symptomatic anemia.  Menometrorrhagia.    Patient Care Team:  Provider, No Known as PCP - General    Chief complaint: Heavy prolonged.        History of present illness: Patient is a 38-year-old female who presented to the emergency department yesterday with complaint of lightheadedness shortness of breath fatigue and syncope.  She reports having vaginal bleeding since June.  Reports on occasion passing clots.  She reports similar episode 3 years ago.  She reports that her bleeding worsened over the past few days.  Patient is not on any contraception.  She reports some cramping.  She reports that she has not seen OB/GYN for this.  She reports 1 visit to an OB/GYN last year for an annual exam with the finding of a normal Pap smear.  Patient is in a lesbian relationship and reports no desire pregnancies at this time.  She has no history of pregnancies in the past.  No fever or chills.  No abnormal vaginal discharge.  No persistent nausea vomiting.  She is a smoker.  She denies any history of endometrial cancer in the family.  She denies any history of cervical cancer.    Review of Systems  Pertinent items are noted in HPI    History  Past Medical History:   Diagnosis Date    Anemia     Asthma     Claustrophobia    , History reviewed. No pertinent surgical history.,   Family History   Problem Relation Age of Onset    Diabetes Mother     Cancer Mother    ,   Social History     Socioeconomic History    Marital status: Single   Tobacco Use    Smoking status: Every Day     Packs/day: 1.00     Types: Cigarettes    Smokeless tobacco: Never    Tobacco comments:     SMOKED 5 YEARS   Vaping Use    Vaping Use: Never used   Substance and Sexual Activity    Alcohol use: Yes     Comment: OCCASIONALLY    Drug use: Never    Sexual activity: Defer     E-cigarette/Vaping    E-cigarette/Vaping Use Never User      E-cigarette/Vaping Substances     E-cigarette/Vaping  Devices           ,   No medications prior to admission.   , Scheduled Meds:  senna-docusate sodium, 2 tablet, Oral, BID  sodium chloride, 10 mL, Intravenous, Q12H    , Continuous Infusions:   , PRN Meds:    senna-docusate sodium **AND** polyethylene glycol **AND** bisacodyl **AND** bisacodyl    nitroglycerin    sodium chloride    sodium chloride    sodium chloride, and Allergies:  Patient has no known allergies.    Objective     Vital Signs   Temp:  [97.8 °F (36.6 °C)-99.3 °F (37.4 °C)] 98.7 °F (37.1 °C)  Heart Rate:  [] 77  Resp:  [16-23] 16  BP: (125-155)/(58-95) 143/80    Physical Exam:     General Appearance:    Alert, cooperative, in no acute distress   Head:    Normocephalic, without obvious abnormality, atraumatic   Eyes:            Lids and lashes normal, conjunctivae and sclerae normal, no   icterus, no pallor, corneas clear, PERRLA   Ears:    Ears appear intact with no abnormalities noted   Throat:   No oral lesions, no thrush, oral mucosa moist   Neck:   No adenopathy, supple, trachea midline, no thyromegaly, no     carotid bruit, no JVD   Back:     No kyphosis present, no scoliosis present, no skin lesions,       erythema or scars, no tenderness to percussion or                   palpation,   range of motion normal   Lungs:     Clear to auscultation,respirations regular, even and                   unlabored    Heart:    Regular rhythm and normal rate, normal S1 and S2, no            murmur, no gallop, no rub, no click   Breast Exam:    Deferred   Abdomen:     Normal bowel sounds, no masses, no organomegaly, soft        non-tender, non-distended, no guarding, no rebound                 tenderness   Genitalia:  Exam deferred at this time.  She had normal pelvic exam in the emergency department.                           Results Review:   I reviewed the patient's new clinical results.      Assessment & Plan     Patient is a 38-year-old female with symptomatic anemia due to menometrorrhagia.  Patient  has not been on any hormonal medications.  Ultrasound was basically normal except for small fibroid.  I discussed with patient that bleeding is likely functional in nature.  I discussed management options including hormones and surgery.  She is not a candidate for OCP due to her age and smoking.  I did discuss the option of Depo-Provera including the benefits and the side effects.  I discussed endometrial ablation and hysterectomy if she continues to desire no pregnancies in the future.  She is leaning towards Depo-Provera but will think about it and make a final decision.  If she decides to go that route then we will give her Depo-Provera 150 mg IM prior to discharge.  She will follow-up with an OB/GYN.    I discussed the patients findings and my recommendations with patient, nursing staff, and consulting provider    Arturo Kent MD  10/03/23  10:58 EDT

## 2023-10-03 NOTE — CONSULTS
Discharge Planning Assessment   [facility]     Patient Name: Marilee Dunne  MRN: [id number]  Today's Date: 10/3/2023    Admit Date: 10/2/2023    Plan: RN CM followed up with patient to discuss discharge planning. Patient is alert and oriented, patient's mother is at bedside. Patient does not have insurance currently, followed up with [facility] med assist to have patient screened for medicaid. Patient does not have a PCP, would like a list day of discharge as well as local OBGYN offices. Patient is independent in all ADL's. Plans on returning home with family. Will have transportation. Informed patient that if there are any additional needs, discharge planning will follow up accordingly.     Discharge Plan       Row Name 10/03/23 1443       Plan    Patient/Family in Agreement with Plan yes    Final Discharge Disposition Code 01 - home or self-care    Final Note Pt discharging home today. PCP list provided. Pt to follow up with Dr. Fairbanks [facility]. No additional needs noted.             Expected Discharge Date and Time       Expected Discharge Date Expected Discharge Time    Oct 3, 2023         RAMEZ Williamson

## 2023-10-04 LAB
BH BB BLOOD EXPIRATION DATE: NORMAL
BH BB BLOOD EXPIRATION DATE: NORMAL
BH BB BLOOD TYPE BARCODE: 8400
BH BB BLOOD TYPE BARCODE: 8400
BH BB DISPENSE STATUS: NORMAL
BH BB DISPENSE STATUS: NORMAL
BH BB PRODUCT CODE: NORMAL
BH BB PRODUCT CODE: NORMAL
BH BB UNIT NUMBER: NORMAL
BH BB UNIT NUMBER: NORMAL
CROSSMATCH INTERPRETATION: NORMAL
CROSSMATCH INTERPRETATION: NORMAL
UNIT  ABO: NORMAL
UNIT  ABO: NORMAL
UNIT  RH: NORMAL
UNIT  RH: NORMAL

## 2023-10-04 NOTE — OUTREACH NOTE
Prep Survey      Flowsheet Row Responses   [facility] facility patient discharged from? [facility]   Is LACE score < 7 ? No   Eligibility Not Eligible   What are the reasons patient is not eligible? Other  [Readmission Risk Score low]   Does the patient have one of the following disease processes/diagnoses(primary or secondary)? Other   Prep survey completed? Yes            Marly THOMPSON - Registered Nurse

## 2023-10-05 ENCOUNTER — OFFICE VISIT (OUTPATIENT)
Dept: OBSTETRICS AND GYNECOLOGY | Facility: CLINIC | Age: 38
End: 2023-10-05
Payer: MEDICAID

## 2023-10-05 VITALS
WEIGHT: 230 LBS | SYSTOLIC BLOOD PRESSURE: 131 MMHG | HEIGHT: 61 IN | HEART RATE: 97 BPM | BODY MASS INDEX: 43.43 KG/M2 | DIASTOLIC BLOOD PRESSURE: 75 MMHG

## 2023-10-05 DIAGNOSIS — D50.0 IRON DEFICIENCY ANEMIA DUE TO CHRONIC BLOOD LOSS: ICD-10-CM

## 2023-10-05 DIAGNOSIS — Z12.4 SCREENING FOR CERVICAL CANCER: ICD-10-CM

## 2023-10-05 DIAGNOSIS — L68.0 FEMALE HIRSUTISM: ICD-10-CM

## 2023-10-05 DIAGNOSIS — N92.1 MENORRHAGIA WITH IRREGULAR CYCLE: Primary | ICD-10-CM

## 2023-10-05 DIAGNOSIS — F17.210 CIGARETTE SMOKER: ICD-10-CM

## 2023-10-05 DIAGNOSIS — E28.2 PCOS (POLYCYSTIC OVARIAN SYNDROME): ICD-10-CM

## 2023-10-05 DIAGNOSIS — Z11.3 SCREEN FOR STD (SEXUALLY TRANSMITTED DISEASE): ICD-10-CM

## 2023-10-05 DIAGNOSIS — D21.9 FIBROID: ICD-10-CM

## 2023-10-05 LAB
ALBUMIN SERPL-MCNC: 4.2 G/DL (ref 3.5–5.2)
ALBUMIN/GLOB SERPL: 1.2 G/DL
ALP SERPL-CCNC: 80 U/L (ref 39–117)
ALT SERPL W P-5'-P-CCNC: 16 U/L (ref 1–33)
ANION GAP SERPL CALCULATED.3IONS-SCNC: 11.8 MMOL/L (ref 5–15)
AST SERPL-CCNC: 32 U/L (ref 1–32)
BILIRUB SERPL-MCNC: 0.3 MG/DL (ref 0–1.2)
BUN SERPL-MCNC: 9 MG/DL (ref 6–20)
BUN/CREAT SERPL: 10.8 (ref 7–25)
C TRACH RRNA CVX QL NAA+PROBE: NOT DETECTED
CALCIUM SPEC-SCNC: 9.3 MG/DL (ref 8.6–10.5)
CANDIDA SPECIES: NEGATIVE
CHLORIDE SERPL-SCNC: 103 MMOL/L (ref 98–107)
CHOLEST SERPL-MCNC: 190 MG/DL (ref 0–200)
CO2 SERPL-SCNC: 21.2 MMOL/L (ref 22–29)
CREAT SERPL-MCNC: 0.83 MG/DL (ref 0.57–1)
EGFRCR SERPLBLD CKD-EPI 2021: 93.8 ML/MIN/1.73
GARDNERELLA VAGINALIS: POSITIVE
GLOBULIN UR ELPH-MCNC: 3.6 GM/DL
GLUCOSE SERPL-MCNC: 80 MG/DL (ref 65–99)
HDLC SERPL-MCNC: 51 MG/DL (ref 40–60)
LDLC SERPL CALC-MCNC: 121 MG/DL (ref 0–100)
LDLC/HDLC SERPL: 2.34 {RATIO}
N GONORRHOEA RRNA SPEC QL NAA+PROBE: NOT DETECTED
POTASSIUM SERPL-SCNC: 4.5 MMOL/L (ref 3.5–5.2)
PROLACTIN SERPL-MCNC: 13.6 NG/ML (ref 4.79–23.3)
PROT SERPL-MCNC: 7.8 G/DL (ref 6–8.5)
SODIUM SERPL-SCNC: 136 MMOL/L (ref 136–145)
T VAGINALIS DNA VAG QL PROBE+SIG AMP: NEGATIVE
T4 FREE SERPL-MCNC: 1.21 NG/DL (ref 0.93–1.7)
TRIGL SERPL-MCNC: 98 MG/DL (ref 0–150)
TSH SERPL DL<=0.05 MIU/L-ACNC: 2.02 UIU/ML (ref 0.27–4.2)
VLDLC SERPL-MCNC: 18 MG/DL (ref 5–40)

## 2023-10-05 PROCEDURE — 87660 TRICHOMONAS VAGIN DIR PROBE: CPT | Performed by: OBSTETRICS & GYNECOLOGY

## 2023-10-05 PROCEDURE — 80053 COMPREHEN METABOLIC PANEL: CPT | Performed by: OBSTETRICS & GYNECOLOGY

## 2023-10-05 PROCEDURE — 84146 ASSAY OF PROLACTIN: CPT | Performed by: OBSTETRICS & GYNECOLOGY

## 2023-10-05 PROCEDURE — 87480 CANDIDA DNA DIR PROBE: CPT | Performed by: OBSTETRICS & GYNECOLOGY

## 2023-10-05 PROCEDURE — G0123 SCREEN CERV/VAG THIN LAYER: HCPCS

## 2023-10-05 PROCEDURE — 84443 ASSAY THYROID STIM HORMONE: CPT | Performed by: OBSTETRICS & GYNECOLOGY

## 2023-10-05 PROCEDURE — 87591 N.GONORRHOEAE DNA AMP PROB: CPT | Performed by: OBSTETRICS & GYNECOLOGY

## 2023-10-05 PROCEDURE — 80061 LIPID PANEL: CPT | Performed by: OBSTETRICS & GYNECOLOGY

## 2023-10-05 PROCEDURE — 84439 ASSAY OF FREE THYROXINE: CPT | Performed by: OBSTETRICS & GYNECOLOGY

## 2023-10-05 PROCEDURE — 87624 HPV HI-RISK TYP POOLED RSLT: CPT

## 2023-10-05 PROCEDURE — 87491 CHLMYD TRACH DNA AMP PROBE: CPT | Performed by: OBSTETRICS & GYNECOLOGY

## 2023-10-05 PROCEDURE — 87510 GARDNER VAG DNA DIR PROBE: CPT | Performed by: OBSTETRICS & GYNECOLOGY

## 2023-10-05 NOTE — PATIENT INSTRUCTIONS
Venipuncture Blood Specimen Collection  Venipuncture performed in left arm by Olga Hines with good hemostasis. Patient tolerated the procedure well without complications.   10/05/23   Olga Hines

## 2023-10-06 ENCOUNTER — TELEPHONE (OUTPATIENT)
Dept: OBSTETRICS AND GYNECOLOGY | Facility: CLINIC | Age: 38
End: 2023-10-06
Payer: MEDICAID

## 2023-10-06 DIAGNOSIS — N76.0 BV (BACTERIAL VAGINOSIS): Primary | ICD-10-CM

## 2023-10-06 DIAGNOSIS — B96.89 BV (BACTERIAL VAGINOSIS): Primary | ICD-10-CM

## 2023-10-06 LAB
DHEA-S SERPL-MCNC: 196 UG/DL (ref 57.3–279.2)
INSULIN SERPL-ACNC: 7.2 UIU/ML (ref 2.6–24.9)

## 2023-10-06 RX ORDER — METRONIDAZOLE 500 MG/1
500 TABLET ORAL 2 TIMES DAILY
Qty: 14 TABLET | Refills: 0 | Status: SHIPPED | OUTPATIENT
Start: 2023-10-06 | End: 2023-10-13

## 2023-10-06 NOTE — TELEPHONE ENCOUNTER
Discussed with patient her vaginitis panel detected a common bacterial infection, bacterial vaginosis. Advised a prescription has been sent to her pharmacy and she should take this completely as directed and follow up with the office if symptoms persist or worsen.

## 2023-10-09 LAB — 17OHP SERPL-MCNC: 13 NG/DL

## 2023-10-09 NOTE — PROGRESS NOTES
"GYN Visit    Chief Complaint   Patient presents with    Follow-up     Embx        HPI:   38 y.o.No obstetric history on file. LMP: Patient's last menstrual period was 07/08/2023 (exact date).     Social History     Substance and Sexual Activity   Sexual Activity Yes    Partners: Female       Testosterone, Bioavailable (M) (10/05/2023 09:48)  DHEA-Sulfate (10/05/2023 09:48)  Comprehensive Metabolic Panel (10/05/2023 09:48)  17-Hydroxyprogesterone (10/05/2023 09:48)  T4, Free (10/05/2023 09:48)  TSH (10/05/2023 09:48)  Prolactin (10/05/2023 09:48)  Insulin, Total (10/05/2023 09:48)  Lipid Panel (10/05/2023 09:48)    Chlamydia trachomatis, Neisseria gonorrhoeae, PCR - Swab, Cervix (10/05/2023 09:33)  Gardnerella vaginalis, Trichomonas vaginalis, Candida albicans, DNA - Swab, Vagina (10/05/2023 09:33)    IgP, Aptima HPV (10/05/2023 09:29)    Progress Notes by Yoselin Fairbanks DO (10/05/2023 09:00)    Last office visit patient was follow-up after ER admission for acute blood loss anemia due to AUB which has been longstanding.  Hematocrit was 28.9 after 2 units of blood.  Follow-up labs included normal TFTs and prolactin.  Intramural fibroid on ultrasound.  Patient also diagnosed with PCOS and hirsutism.  Labs within normal limits.  No evidence of insulin resistance.    We previously discussed to the options: hormonal-progestin only, Mirena IUD, Depo, out pt OR D+C H/S myosure, hyst, do not rec ablation w PCOS to chronic anovulation and increased risk of endometrial malignancy.  Smoking limits options to progestin only.  Pt desires POP if EMBx neg, she received one dose DepoProvera 10/3/23 while admitted to [facility].    Has has no further VB.  Has had a hx of pain w menses.  Hasn't tried meds. Can be severe at times.    History: PMHx, Meds, Allergies, PSHx, Social Hx, and POBHx all reviewed and updated.    PHYSICAL EXAM:  /79   Pulse 114   Ht 154.9 cm (60.98\")   Wt 104 kg (229 lb)   LMP 07/08/2023 (Exact Date) Comment: " never stoped  BMI 43.29 kg/m²   Facility age limit for growth %norberto is 20 years.  General- NAD, alert and oriented, appropriate  Psych- Normal mood, good memory      ASSESSMENT AND PLAN:  Diagnoses and all orders for this visit:    1. Menometrorrhagia (Primary)  Overview:  Oct 2023, hct 28 (after 2U PRBC), US 1.2cm intramural fibroid, nl TFTs, nl prolactin  Oct 3, 2023, received one dose DepoProvera at [facility]    Orders:  -     Tissue Pathology Exam  -     norethindrone (MICRONOR) 0.35 MG tablet; Take 1 tablet by mouth Daily.  Dispense: 90 tablet; Refill: 4    2. Dysmenorrhea  -     norethindrone (MICRONOR) 0.35 MG tablet; Take 1 tablet by mouth Daily.  Dispense: 90 tablet; Refill: 4  -     ibuprofen (ADVIL,MOTRIN) 800 MG tablet; Start taking by mouth 2-3 days prior to starting your period.  And take on a regular basis every 8 hours.  Continue for 5 days.  Dispense: 30 tablet; Refill: 5    3. Iron deficiency anemia due to chronic blood loss  Overview:  2u PRBC Oct 2, 2023      4. Fibroid  Overview:  Oct 2023 1.2cm intramural, post/lateral      5. PCOS (polycystic ovarian syndrome)  Overview:  Chronic anovulation and clinical hirsutism  Oct 2023 nl DHEA-S, nl 17OHP, nl testosterone, nl insulin- no resistance, nl chem,     Orders:  -     Tissue Pathology Exam  -     norethindrone (MICRONOR) 0.35 MG tablet; Take 1 tablet by mouth Daily.  Dispense: 90 tablet; Refill: 4    6. Female hirsutism  Overview:  Oct 2023 nl DHEA-S, nl 17OHP, nl testosterone      7. Cigarette smoker    OK to start POP in Jan 2024.  Declines surgery.      Follow Up:  Return in about 3 months (around 1/16/2024) for FU menses.          Yoselin Fairbanks,   10/16/2023    [facility] [place] [facility]  [address] [place] [address]  Dept: [phone]  Dept Fax: [phone]  Loc: [phone]  Loc Fax: [phone]     Endometrial Biopsy Procedure Note      CC:  Pt presents for Endometrial Bx  Chief Complaint  "  Patient presents with    Follow-up     Embx        Social History     Substance and Sexual Activity   Sexual Activity Yes    Partners: Female     LMP: Patient's last menstrual period was 07/08/2023 (exact date).       [facility]:  Negative  Consent signed: yes    Procedure reviewed in detail.  She understands the potential risks include, but are not limited to, pain, bleeding, uterine perforation and infection.  Her questions have been answered.      Subjective/HPI:  See above    Objective:  /79   Pulse 114   Ht 154.9 cm (60.98\")   Wt 104 kg (229 lb)   LMP 07/08/2023 (Exact Date) Comment: never stoped  BMI 43.29 kg/m²   External genitalia- Without lesion   Vulva/Vagina/Perineum- Without lesion  Cervix- Without lesion  Uterus- Normal size, shape & consistency.  Non tender, mobile, & no prolapse, Anteverted  Betadine/Hibiclens x3.  Tenaculum placed.  Uterus sounded to 7cm.    EMBx performed without difficulty.  Minimal tissue w 3 passes.   Tissue sent for pathology.    Patient tolerated the procedure well.    Assessment and Plan:  Diagnoses and all orders for this visit:    1. Menometrorrhagia (Primary)  Overview:  Oct 2023, hct 28 (after 2U PRBC), US 1.2cm intramural fibroid, nl TFTs, nl prolactin  Oct 3, 2023, received one dose DepoProvera at [facility]    Orders:  -     Tissue Pathology Exam  -     norethindrone (MICRONOR) 0.35 MG tablet; Take 1 tablet by mouth Daily.  Dispense: 90 tablet; Refill: 4    2. Dysmenorrhea  -     norethindrone (MICRONOR) 0.35 MG tablet; Take 1 tablet by mouth Daily.  Dispense: 90 tablet; Refill: 4  -     ibuprofen (ADVIL,MOTRIN) 800 MG tablet; Start taking by mouth 2-3 days prior to starting your period.  And take on a regular basis every 8 hours.  Continue for 5 days.  Dispense: 30 tablet; Refill: 5    3. Iron deficiency anemia due to chronic blood loss  Overview:  2u PRBC Oct 2, 2023      4. Fibroid  Overview:  Oct 2023 1.2cm intramural, post/lateral      5. PCOS (polycystic ovarian " syndrome)  Overview:  Chronic anovulation and clinical hirsutism  Oct 2023 nl DHEA-S, nl 17OHP, nl testosterone, nl insulin- no resistance, nl chem,     Orders:  -     Tissue Pathology Exam  -     norethindrone (MICRONOR) 0.35 MG tablet; Take 1 tablet by mouth Daily.  Dispense: 90 tablet; Refill: 4    6. Female hirsutism  Overview:  Oct 2023 nl DHEA-S, nl 17OHP, nl testosterone      7. Cigarette smoker          Counseling:  Biopsy is ~94% sensitive, a negative biopsy is not 100% certain of no malignancy.   PRECAUTIONS - It is common to have bright red spotting and/or bleeding.  She should not be bleeding heavily.  She can use OTC pain relievers prn.  She needs to return to office or ER (if after hours/weekends) if she has pelvic pain, bleeding > 1 pad/2 hours, discharge that has a bad vaginal odor or vaginal itching, fever > 101.5, or any other concerns.    Pt to call office if hasn't received results and recommended next steps in the next 7-10days.              Follow Up:  Return in about 3 months (around 1/16/2024) for FU menses.        Yoselin Fairbanks, DO  10/16/2023    [facility] [place] [facility]  [facility] [place] [address]  Dept: [phone]  Dept Fax: [phone]  Loc: [phone]  Loc Fax: [phone]

## 2023-10-10 LAB
CYTOLOGIST CVX/VAG CYTO: NORMAL
CYTOLOGY CVX/VAG DOC CYTO: NORMAL
CYTOLOGY CVX/VAG DOC THIN PREP: NORMAL
DX ICD CODE: NORMAL
HIV 1 & 2 AB SER-IMP: NORMAL
HPV I/H RISK 4 DNA CVX QL PROBE+SIG AMP: NEGATIVE
OTHER STN SPEC: NORMAL
STAT OF ADQ CVX/VAG CYTO-IMP: NORMAL

## 2023-10-14 LAB
TESTOST SERPL-MCNC: 17 NG/DL
TESTOSTERONE.FREE+WB MFR SERPL: 23 %
TESTOSTERONE.FREE+WB SERPL-MCNC: 3.9 NG/DL

## 2023-10-15 PROBLEM — N92.1 MENOMETRORRHAGIA: Status: ACTIVE | Noted: 2023-10-03

## 2023-10-16 ENCOUNTER — PROCEDURE VISIT (OUTPATIENT)
Dept: OBSTETRICS AND GYNECOLOGY | Facility: CLINIC | Age: 38
End: 2023-10-16
Payer: MEDICAID

## 2023-10-16 VITALS
SYSTOLIC BLOOD PRESSURE: 130 MMHG | BODY MASS INDEX: 43.23 KG/M2 | HEART RATE: 114 BPM | HEIGHT: 61 IN | WEIGHT: 229 LBS | DIASTOLIC BLOOD PRESSURE: 79 MMHG

## 2023-10-16 DIAGNOSIS — N94.6 DYSMENORRHEA: ICD-10-CM

## 2023-10-16 DIAGNOSIS — D50.0 IRON DEFICIENCY ANEMIA DUE TO CHRONIC BLOOD LOSS: ICD-10-CM

## 2023-10-16 DIAGNOSIS — F17.210 CIGARETTE SMOKER: ICD-10-CM

## 2023-10-16 DIAGNOSIS — E28.2 PCOS (POLYCYSTIC OVARIAN SYNDROME): ICD-10-CM

## 2023-10-16 DIAGNOSIS — N92.1 MENOMETRORRHAGIA: Primary | ICD-10-CM

## 2023-10-16 DIAGNOSIS — D21.9 FIBROID: ICD-10-CM

## 2023-10-16 DIAGNOSIS — L68.0 FEMALE HIRSUTISM: ICD-10-CM

## 2023-10-16 PROCEDURE — 88305 TISSUE EXAM BY PATHOLOGIST: CPT | Performed by: OBSTETRICS & GYNECOLOGY

## 2023-10-16 RX ORDER — IBUPROFEN 800 MG/1
TABLET ORAL
Qty: 30 TABLET | Refills: 5 | Status: SHIPPED | OUTPATIENT
Start: 2023-10-16

## 2023-10-16 RX ORDER — ACETAMINOPHEN AND CODEINE PHOSPHATE 120; 12 MG/5ML; MG/5ML
1 SOLUTION ORAL DAILY
Qty: 90 TABLET | Refills: 4 | Status: SHIPPED | OUTPATIENT
Start: 2023-10-16

## 2023-10-18 LAB
CYTO UR: NORMAL
LAB AP CASE REPORT: NORMAL
LAB AP CLINICAL INFORMATION: NORMAL
PATH REPORT.FINAL DX SPEC: NORMAL
PATH REPORT.GROSS SPEC: NORMAL

## 2024-01-17 NOTE — PROGRESS NOTES
"GYN Visit    Chief Complaint   Patient presents with    Follow-up     FU menses       HPI:   38 y.o.No obstetric history on file. LMP: Patient's last menstrual period was 11/24/2023 (exact date).     Social History     Substance and Sexual Activity   Sexual Activity Yes    Partners: Female     Procedure visit with Yoselin Fairbanks DO (10/16/2023)    History of severe blood loss anemia requiring blood transfusion, dysmenorrhea, known PCOS with hirsutism.  Given Depo in hospital, has not started Micronor yet.  Cigarette smoker.  Known 1.2 cm intramural fibroid.      No menses after Depo, occas spotting, last spotting Oct/Nov.  No pain.  Breast tender only w spotting.    Hair growth on face, was considering laser.      History: PMHx, Meds, Allergies, PSHx, Social Hx, and POBHx all reviewed and updated.    PHYSICAL EXAM:  /83   Pulse 102   Ht 154.9 cm (60.98\")   Wt 102 kg (225 lb)   LMP 11/24/2023 (Exact Date)   BMI 42.54 kg/m²   Facility age limit for growth %norberto is 20 years.  General- NAD, alert and oriented, appropriate  Psych- Normal mood, good memory      ASSESSMENT AND PLAN:  Diagnoses and all orders for this visit:    1. Menometrorrhagia (Primary)  Comments:  Ok to now start micronor  Overview:  Oct 2023, hct 28 (after 2U PRBC), US 1.2cm intramural fibroid, nl TFTs, nl prolactin  Oct 2023, received one dose DepoProvera at [facility]   EMBx neg, inactive endometrium      2. Iron deficiency anemia due to chronic blood loss  Overview:  Hx of blood loss anemia  2u PRBC Oct 2, 2023    Orders:  -     CBC (No Diff)    3. Fibroid  Overview:  Oct 2023 1.2cm intramural, post/lateral      4. Dysmenorrhea  Comments:  hx of    5. PCOS (polycystic ovarian syndrome)  Overview:  Chronic anovulation and clinical hirsutism  Oct 2023 nl DHEA-S, nl 17OHP, nl testosterone, nl insulin- no resistance, nl chem,       6. Female hirsutism  Comments:  Option inc aldactone to 100mg BID if persists  Overview:  Oct 2023 nl DHEA-S, nl " 17OHP, nl testosterone    Orders:  -     spironolactone (ALDACTONE) 50 MG tablet; Take 1 tablet by mouth 2 (Two) Times a Day.  Dispense: 60 tablet; Refill: 5          Follow Up:  Return in about 6 months (around 7/22/2024) for FU MEDS.          Yoselin Fairbanks DO  01/22/2024    [facility] [place] [facility]  [facility] [place] [address]  Dept: [phone]  Dept Fax: [phone]  Loc: [phone]  Loc Fax: [phone]

## 2024-01-22 ENCOUNTER — OFFICE VISIT (OUTPATIENT)
Dept: OBSTETRICS AND GYNECOLOGY | Facility: CLINIC | Age: 39
End: 2024-01-22
Payer: MEDICAID

## 2024-01-22 VITALS
BODY MASS INDEX: 42.48 KG/M2 | DIASTOLIC BLOOD PRESSURE: 83 MMHG | SYSTOLIC BLOOD PRESSURE: 136 MMHG | WEIGHT: 225 LBS | HEART RATE: 102 BPM | HEIGHT: 61 IN

## 2024-01-22 DIAGNOSIS — N94.6 DYSMENORRHEA: ICD-10-CM

## 2024-01-22 DIAGNOSIS — D21.9 FIBROID: ICD-10-CM

## 2024-01-22 DIAGNOSIS — L68.0 FEMALE HIRSUTISM: ICD-10-CM

## 2024-01-22 DIAGNOSIS — N92.1 MENOMETRORRHAGIA: Primary | ICD-10-CM

## 2024-01-22 DIAGNOSIS — D50.0 IRON DEFICIENCY ANEMIA DUE TO CHRONIC BLOOD LOSS: ICD-10-CM

## 2024-01-22 DIAGNOSIS — E28.2 PCOS (POLYCYSTIC OVARIAN SYNDROME): ICD-10-CM

## 2024-01-22 LAB
DEPRECATED RDW RBC AUTO: 44.1 FL (ref 37–54)
ERYTHROCYTE [DISTWIDTH] IN BLOOD BY AUTOMATED COUNT: 18.9 % (ref 12.3–15.4)
HCT VFR BLD AUTO: 36.7 % (ref 34–46.6)
HGB BLD-MCNC: 10.7 G/DL (ref 12–15.9)
MCH RBC QN AUTO: 19.9 PG (ref 26.6–33)
MCHC RBC AUTO-ENTMCNC: 29.2 G/DL (ref 31.5–35.7)
MCV RBC AUTO: 68.1 FL (ref 79–97)
PLATELET # BLD AUTO: 702 10*3/MM3 (ref 140–450)
PMV BLD AUTO: 10.5 FL (ref 6–12)
RBC # BLD AUTO: 5.39 10*6/MM3 (ref 3.77–5.28)
WBC NRBC COR # BLD AUTO: 6.84 10*3/MM3 (ref 3.4–10.8)

## 2024-01-22 PROCEDURE — 99213 OFFICE O/P EST LOW 20 MIN: CPT | Performed by: OBSTETRICS & GYNECOLOGY

## 2024-01-22 PROCEDURE — 85027 COMPLETE CBC AUTOMATED: CPT | Performed by: OBSTETRICS & GYNECOLOGY

## 2024-01-22 RX ORDER — SPIRONOLACTONE 50 MG/1
50 TABLET, FILM COATED ORAL 2 TIMES DAILY
Qty: 60 TABLET | Refills: 5 | Status: SHIPPED | OUTPATIENT
Start: 2024-01-22

## 2024-01-22 NOTE — PATIENT INSTRUCTIONS
Venipuncture Blood Specimen Collection  Venipuncture performed in left arm by Olga Hines with good hemostasis. Patient tolerated the procedure well without complications.   01/22/24   Olga Hines

## 2024-01-23 PROBLEM — D75.839 THROMBOCYTOSIS: Status: ACTIVE | Noted: 2024-01-23

## 2024-01-26 DIAGNOSIS — D75.839 THROMBOCYTOSIS: Primary | ICD-10-CM

## 2024-02-01 ENCOUNTER — OFFICE VISIT (OUTPATIENT)
Dept: FAMILY MEDICINE CLINIC | Facility: CLINIC | Age: 39
End: 2024-02-01
Payer: MEDICAID

## 2024-02-01 VITALS
TEMPERATURE: 98.7 F | DIASTOLIC BLOOD PRESSURE: 62 MMHG | RESPIRATION RATE: 16 BRPM | WEIGHT: 227 LBS | BODY MASS INDEX: 41.77 KG/M2 | OXYGEN SATURATION: 100 % | SYSTOLIC BLOOD PRESSURE: 114 MMHG | HEART RATE: 87 BPM | HEIGHT: 62 IN

## 2024-02-01 DIAGNOSIS — D75.839 THROMBOCYTOSIS: ICD-10-CM

## 2024-02-01 DIAGNOSIS — E28.2 PCOS (POLYCYSTIC OVARIAN SYNDROME): ICD-10-CM

## 2024-02-01 DIAGNOSIS — E66.01 MORBID OBESITY WITH BMI OF 40.0-44.9, ADULT: ICD-10-CM

## 2024-02-01 DIAGNOSIS — N92.1 MENORRHAGIA WITH IRREGULAR CYCLE: ICD-10-CM

## 2024-02-01 DIAGNOSIS — D21.9 FIBROID: ICD-10-CM

## 2024-02-01 DIAGNOSIS — D50.0 IRON DEFICIENCY ANEMIA DUE TO CHRONIC BLOOD LOSS: ICD-10-CM

## 2024-02-01 DIAGNOSIS — Z76.89 ENCOUNTER TO ESTABLISH CARE: Primary | ICD-10-CM

## 2024-02-01 NOTE — PROGRESS NOTES
"Chief Complaint  Establishing care for management of anemia/thrombocytosis    Subjective         Marilee Dunne is a 38 y.o. female who presents to [facility]    38 years old comes to the clinic today to establish care and follow-up on chronic conditions.    Following up with OB/GYN, does have a history of menorrhagia with irregular menstrual cycles and iron deficiency anemia due to heavy menstrual cycles.    Patient was recently started with oral contraceptive pills, did have blood transfusion within the last 6 months for severe anemia.    Currently reports no menstrual cycle since last blood work, reports no chest pain or shortness of breath.    Patient was found to have very high platelets and advised to follow-up with hematologist.    Patient is not taking ibuprofen regularly and recently started on spironolactone.    Patient reports allergies to iron.    Objective   Vital Signs:   Vitals:    02/01/24 1259   BP: 114/62   Pulse: 87   Resp: 16   Temp: 98.7 °F (37.1 °C)   SpO2: 100%   Weight: 103 kg (227 lb)   Height: 157.5 cm (62\")      Body mass index is 41.52 kg/m².   Wt Readings from Last 3 Encounters:   02/01/24 103 kg (227 lb)   01/22/24 102 kg (225 lb)   01/09/24 103 kg (228 lb)      BP Readings from Last 3 Encounters:   02/01/24 114/62   01/22/24 136/83   01/09/24 130/86        Patient Care Team:  Provider, No Known as PCP - General     Physical Exam  Vitals reviewed.   Constitutional:       Appearance: Normal appearance. She is well-developed.   HENT:      Head: Normocephalic and atraumatic.      Right Ear: External ear normal.      Left Ear: External ear normal.      Mouth/Throat:      Pharynx: No oropharyngeal exudate.   Eyes:      Conjunctiva/sclera: Conjunctivae normal.      Pupils: Pupils are equal, round, and reactive to light.   Cardiovascular:      Rate and Rhythm: Normal rate and regular rhythm.      Heart sounds: No murmur heard.     No friction rub. No gallop. "   Pulmonary:      Effort: Pulmonary effort is normal.      Breath sounds: Normal breath sounds. No wheezing or rhonchi.   Abdominal:      General: Bowel sounds are normal. There is no distension.      Palpations: Abdomen is soft.      Tenderness: There is no abdominal tenderness.   Skin:     General: Skin is warm and dry.   Neurological:      Mental Status: She is alert and oriented to person, place, and time.      Cranial Nerves: No cranial nerve deficit.   Psychiatric:         Mood and Affect: Mood and affect normal.         Behavior: Behavior normal.         Thought Content: Thought content normal.         Judgment: Judgment normal.            Class 3 Severe Obesity (BMI >=40). Obesity-related health conditions include the following: obstructive sleep apnea, hypertension, and coronary heart disease. Obesity is unchanged. BMI is is above average; BMI management plan is completed. We discussed portion control and increasing exercise.              Assessment and Plan   Diagnoses and all orders for this visit:    1. Encounter to establish care (Primary)    2. Menorrhagia with irregular cycle  Comments:  Continue with Micronor and GYN    3. Iron deficiency anemia due to chronic blood loss  Comments:  Following up with GYN for heavy menstrual cycles, hematologist for possible eval for iron infusion if needed  Orders:  -     Ambulatory Referral to Hematology    4. Thrombocytosis  Comments:  Hematology consult placed  Orders:  -     Ambulatory Referral to Hematology    5. Fibroid    6. PCOS (polycystic ovarian syndrome)  Comments:  Started on spironolactone    7. Morbid obesity with BMI of 40.0-44.9, adult  Comments:  Lifestyle modifications discussed          Tobacco Use: High Risk (2/1/2024)    Patient History     Smoking Tobacco Use: Some Days     Smokeless Tobacco Use: Never     Passive Exposure: Current            Follow Up   Return in about 6 months (around 8/1/2024).  Patient was given instructions and counseling  regarding her condition or for health maintenance advice. Please see specific information pulled into the AVS if appropriate.

## 2024-02-06 ENCOUNTER — TELEPHONE (OUTPATIENT)
Dept: OBSTETRICS AND GYNECOLOGY | Facility: CLINIC | Age: 39
End: 2024-02-06
Payer: MEDICAID

## 2024-02-06 RX ORDER — CALCIUM CARBONATE 500(1250)
500 TABLET ORAL DAILY
Qty: 90 TABLET | Refills: 4 | Status: SHIPPED | OUTPATIENT
Start: 2024-02-06

## 2024-02-06 RX ORDER — MEDROXYPROGESTERONE ACETATE 150 MG/ML
150 INJECTION, SUSPENSION INTRAMUSCULAR
Qty: 1 EACH | Refills: 4 | Status: SHIPPED | OUTPATIENT
Start: 2024-02-06

## 2024-03-21 ENCOUNTER — CONSULT (OUTPATIENT)
Dept: ONCOLOGY | Facility: HOSPITAL | Age: 39
End: 2024-03-21
Payer: MEDICAID

## 2024-03-21 ENCOUNTER — LAB (OUTPATIENT)
Dept: ONCOLOGY | Facility: HOSPITAL | Age: 39
End: 2024-03-21
Payer: MEDICAID

## 2024-03-21 VITALS
RESPIRATION RATE: 18 BRPM | SYSTOLIC BLOOD PRESSURE: 150 MMHG | HEART RATE: 113 BPM | DIASTOLIC BLOOD PRESSURE: 77 MMHG | OXYGEN SATURATION: 100 % | BODY MASS INDEX: 41.59 KG/M2 | TEMPERATURE: 98.2 F | WEIGHT: 227.4 LBS

## 2024-03-21 DIAGNOSIS — R71.8 MICROCYTOSIS: ICD-10-CM

## 2024-03-21 DIAGNOSIS — R71.8 MICROCYTOSIS: Primary | ICD-10-CM

## 2024-03-21 DIAGNOSIS — D64.9 CHRONIC ANEMIA: ICD-10-CM

## 2024-03-21 LAB
ALBUMIN SERPL-MCNC: 4.1 G/DL (ref 3.5–5.2)
ALBUMIN/GLOB SERPL: 1.3 G/DL
ALP SERPL-CCNC: 88 U/L (ref 39–117)
ALT SERPL W P-5'-P-CCNC: 9 U/L (ref 1–33)
ANION GAP SERPL CALCULATED.3IONS-SCNC: 4.1 MMOL/L (ref 5–15)
ANISOCYTOSIS BLD QL: ABNORMAL
AST SERPL-CCNC: 11 U/L (ref 1–32)
BASOPHILS # BLD AUTO: 0.04 10*3/MM3 (ref 0–0.2)
BASOPHILS # BLD MANUAL: 0.11 10*3/MM3 (ref 0–0.2)
BASOPHILS NFR BLD AUTO: 0.7 % (ref 0–1.5)
BASOPHILS NFR BLD MANUAL: 2 % (ref 0–1.5)
BILIRUB SERPL-MCNC: 0.3 MG/DL (ref 0–1.2)
BUN SERPL-MCNC: 9 MG/DL (ref 6–20)
BUN/CREAT SERPL: 10.8 (ref 7–25)
CALCIUM SPEC-SCNC: 8.9 MG/DL (ref 8.6–10.5)
CHLORIDE SERPL-SCNC: 106 MMOL/L (ref 98–107)
CO2 SERPL-SCNC: 23.9 MMOL/L (ref 22–29)
CREAT SERPL-MCNC: 0.83 MG/DL (ref 0.57–1)
DEPRECATED RDW RBC AUTO: 47.2 FL (ref 37–54)
EGFRCR SERPLBLD CKD-EPI 2021: 93.8 ML/MIN/1.73
EOSINOPHIL # BLD AUTO: 0.12 10*3/MM3 (ref 0–0.4)
EOSINOPHIL # BLD MANUAL: 0.11 10*3/MM3 (ref 0–0.4)
EOSINOPHIL NFR BLD AUTO: 2.2 % (ref 0.3–6.2)
EOSINOPHIL NFR BLD MANUAL: 2 % (ref 0.3–6.2)
ERYTHROCYTE [DISTWIDTH] IN BLOOD BY AUTOMATED COUNT: 20.1 % (ref 12.3–15.4)
FERRITIN SERPL-MCNC: 10.77 NG/ML (ref 13–150)
GLOBULIN UR ELPH-MCNC: 3.1 GM/DL
GLUCOSE SERPL-MCNC: 131 MG/DL (ref 65–99)
HAPTOGLOB SERPL-MCNC: 207 MG/DL (ref 30–200)
HCT VFR BLD AUTO: 34 % (ref 34–46.6)
HGB BLD-MCNC: 10.4 G/DL (ref 12–15.9)
HYPOCHROMIA BLD QL: ABNORMAL
IMM GRANULOCYTES # BLD AUTO: 0.01 10*3/MM3 (ref 0–0.05)
IMM GRANULOCYTES NFR BLD AUTO: 0.2 % (ref 0–0.5)
IRON 24H UR-MRATE: 39 MCG/DL (ref 37–145)
IRON SATN MFR SERPL: 8 % (ref 20–50)
LDH SERPL-CCNC: 184 U/L (ref 135–214)
LYMPHOCYTES # BLD AUTO: 1.83 10*3/MM3 (ref 0.7–3.1)
LYMPHOCYTES # BLD MANUAL: 2.09 10*3/MM3 (ref 0.7–3.1)
LYMPHOCYTES NFR BLD AUTO: 33.3 % (ref 19.6–45.3)
LYMPHOCYTES NFR BLD MANUAL: 4 % (ref 5–12)
MCH RBC QN AUTO: 20.3 PG (ref 26.6–33)
MCHC RBC AUTO-ENTMCNC: 30.6 G/DL (ref 31.5–35.7)
MCV RBC AUTO: 66.3 FL (ref 79–97)
MONOCYTES # BLD AUTO: 0.41 10*3/MM3 (ref 0.1–0.9)
MONOCYTES # BLD: 0.22 10*3/MM3 (ref 0.1–0.9)
MONOCYTES NFR BLD AUTO: 7.5 % (ref 5–12)
NEUTROPHILS # BLD AUTO: 2.97 10*3/MM3 (ref 1.7–7)
NEUTROPHILS NFR BLD AUTO: 3.09 10*3/MM3 (ref 1.7–7)
NEUTROPHILS NFR BLD AUTO: 56.1 % (ref 42.7–76)
NEUTROPHILS NFR BLD MANUAL: 54 % (ref 42.7–76)
PATHOLOGY REVIEW: YES
PLATELET # BLD AUTO: 649 10*3/MM3 (ref 140–450)
PMV BLD AUTO: 9.7 FL (ref 6–12)
POTASSIUM SERPL-SCNC: 3.4 MMOL/L (ref 3.5–5.2)
PROT SERPL-MCNC: 7.2 G/DL (ref 6–8.5)
RBC # BLD AUTO: 5.13 10*6/MM3 (ref 3.77–5.28)
SMALL PLATELETS BLD QL SMEAR: ABNORMAL
SODIUM SERPL-SCNC: 134 MMOL/L (ref 136–145)
TIBC SERPL-MCNC: 465 MCG/DL (ref 298–536)
TRANSFERRIN SERPL-MCNC: 312 MG/DL (ref 200–360)
VARIANT LYMPHS NFR BLD MANUAL: 38 % (ref 19.6–45.3)
WBC MORPH BLD: NORMAL
WBC NRBC COR # BLD AUTO: 5.5 10*3/MM3 (ref 3.4–10.8)

## 2024-03-21 PROCEDURE — 85025 COMPLETE CBC W/AUTO DIFF WBC: CPT

## 2024-03-21 PROCEDURE — 83010 ASSAY OF HAPTOGLOBIN QUANT: CPT

## 2024-03-21 PROCEDURE — 83540 ASSAY OF IRON: CPT

## 2024-03-21 PROCEDURE — 82728 ASSAY OF FERRITIN: CPT

## 2024-03-21 PROCEDURE — 83020 HEMOGLOBIN ELECTROPHORESIS: CPT

## 2024-03-21 PROCEDURE — G0463 HOSPITAL OUTPT CLINIC VISIT: HCPCS | Performed by: NURSE PRACTITIONER

## 2024-03-21 PROCEDURE — 80053 COMPREHEN METABOLIC PANEL: CPT

## 2024-03-21 PROCEDURE — 83021 HEMOGLOBIN CHROMOTOGRAPHY: CPT

## 2024-03-21 PROCEDURE — 84466 ASSAY OF TRANSFERRIN: CPT

## 2024-03-21 PROCEDURE — 36415 COLL VENOUS BLD VENIPUNCTURE: CPT

## 2024-03-21 PROCEDURE — 83615 LACTATE (LD) (LDH) ENZYME: CPT

## 2024-03-21 NOTE — PROGRESS NOTES
Chief Complaint/Reason for Referral:   Iron deficiency anemia due to chronic blood loss    Donya Epstein MD  Provider, No Known    Records Obtained:  Records of the patients history including those obtained from  [facility] and patient information were reviewed and summarized in detail.    Subjective    History of Present Illness    Ms. Marilee Dunne is a very pleasant 38 year old  female who presents to our office for iron deficiency anemia, thrombocytosis as referral from her PCP: Dr. Epstein.     PMH includes: heavy menses, anemia, smoker, asthma, PCOS, irregular menses. She reports she has some type of rash to the right outer thigh that itches and thinks this is why she was here today. She reports she has had heavy periods lasting 7-8 months and was previously on the depo injections which was controlling the heavy menses but then her GYN put her back on OCP instead. Patient reports she had actually lost weight with the depo injections. She has had chronic anemia dating back to at least 2021, and likely longer by the patient history. She has received blood transfusions  x 2 in the past back in October of 2023 when her hemoglobin dropped to 5.3. Normally, hemoglobin is baseline of 9-10 range. Platelets have been elevated in the range of 499 to 702. MCV has been in the range of 60 - 70. She reports when she takes oral iron she has diarrhea, vomiting and stomach cramps.     There are no recent iron studies to review.           Oncology/Hematology History    No history exists.       Review of Systems   Constitutional:  Positive for fatigue. Negative for appetite change, diaphoresis, fever, unexpected weight gain and unexpected weight loss.   HENT:  Negative for hearing loss, sore throat and voice change.    Eyes:  Negative for blurred vision, double vision, pain, redness and visual disturbance.   Respiratory:  Negative for cough, shortness of breath and wheezing.    Cardiovascular:  Negative for chest pain,  palpitations and leg swelling.   Endocrine: Negative for cold intolerance, heat intolerance, polydipsia and polyuria.   Genitourinary:  Negative for decreased urine volume, difficulty urinating, frequency and urinary incontinence.   Musculoskeletal:  Negative for arthralgias, back pain, joint swelling and myalgias.   Skin:  Positive for rash. Negative for color change, skin lesions and wound.   Neurological:  Positive for weakness and light-headedness. Negative for dizziness, seizures, numbness and headache.   Hematological:  Negative for adenopathy. Does not bruise/bleed easily.   Psychiatric/Behavioral:  Negative for depressed mood. The patient is not nervous/anxious.    All other systems reviewed and are negative.      Current Outpatient Medications on File Prior to Visit   Medication Sig Dispense Refill    Calcium 500 MG tablet Take 500 mg by mouth Daily. (Patient not taking: Reported on 3/21/2024) 90 tablet 4    medroxyPROGESTERone (DEPO-PROVERA) 150 MG/ML injection Inject 1 mL into the appropriate muscle as directed by prescriber Every 3 (Three) Months. (Patient not taking: Reported on 3/21/2024) 1 each 4    spironolactone (ALDACTONE) 50 MG tablet Take 1 tablet by mouth 2 (Two) Times a Day. (Patient not taking: Reported on 2/1/2024) 60 tablet 5     No current facility-administered medications on file prior to visit.       Allergies   Allergen Reactions    Iron Glycinate Sulfate [Iron] Nausea And Vomiting     Iron sulfate     Past Medical History:   Diagnosis Date    Anemia     Asthma     Claustrophobia     Clotting disorder 2023    Deep vein thrombosis 2023    Fibroid     Ovarian cyst     PCOS (polycystic ovarian syndrome)     Polycystic ovary syndrome     Vaginal bleeding 10/03/2023     No past surgical history on file.  Social History     Socioeconomic History    Marital status: Single   Tobacco Use    Smoking status: Some Days     Current packs/day: 0.00     Average packs/day: 0.3 packs/day for 7.1 years  (1.8 ttl pk-yrs)     Types: Cigarettes     Start date: 2017     Last attempt to quit: 2024     Years since quittin.1     Passive exposure: Current    Smokeless tobacco: Never    Tobacco comments:     SMOKED 5 YEARS   Vaping Use    Vaping status: Never Used   Substance and Sexual Activity    Alcohol use: Yes     Comment: OCCASIONALLY    Drug use: Never    Sexual activity: Yes     Partners: Female     Family History   Problem Relation Age of Onset    Diabetes Mother     Cancer Mother     Breast cancer Sister 55        1/2 sister    Ovarian cancer Neg Hx     Uterine cancer Neg Hx     Colon cancer Neg Hx     Deep vein thrombosis Neg Hx     Pulmonary embolism Neg Hx      Immunization History   Administered Date(s) Administered    COVID-19 (MODERNA) 1st,2nd,3rd Dose Monovalent 2021, 2021    Tdap 2021       Tobacco Use: High Risk (2024)    Patient History     Smoking Tobacco Use: Some Days     Smokeless Tobacco Use: Never     Passive Exposure: Current       Objective     Physical Exam  Vitals and nursing note reviewed.   Constitutional:       Appearance: Normal appearance. She is obese.   HENT:      Head: Normocephalic.      Nose: Nose normal.      Mouth/Throat:      Mouth: Mucous membranes are moist.   Eyes:      Pupils: Pupils are equal, round, and reactive to light.   Cardiovascular:      Rate and Rhythm: Normal rate and regular rhythm.      Pulses: Normal pulses.      Heart sounds: Normal heart sounds. No murmur heard.  Pulmonary:      Effort: Pulmonary effort is normal. No respiratory distress.      Breath sounds: Normal breath sounds.   Abdominal:      General: Bowel sounds are normal.      Palpations: Abdomen is soft.   Musculoskeletal:         General: Normal range of motion.      Cervical back: Normal range of motion and neck supple.   Skin:     General: Skin is warm and dry.      Capillary Refill: Capillary refill takes less than 2 seconds.   Neurological:      General: No focal  deficit present.      Mental Status: She is alert and oriented to person, place, and time.   Psychiatric:         Mood and Affect: Mood normal.         Behavior: Behavior normal.         Thought Content: Thought content normal.         Judgment: Judgment normal.         Vitals:    03/21/24 1311   BP: 150/77   Pulse: 113   Resp: 18   Temp: 98.2 °F (36.8 °C)   TempSrc: Temporal   SpO2: 100%   Weight: 103 kg (227 lb 6.4 oz)   PainSc: 0-No pain       Wt Readings from Last 3 Encounters:   03/21/24 103 kg (227 lb 6.4 oz)   02/01/24 103 kg (227 lb)   01/22/24 102 kg (225 lb)                 ECOG score: 0         ECOG: (0) Fully Active - Able to Carry On All Pre-disease Performance Without Restriction  Fall Risk Assessment was completed, and patient is at low risk for falls.  PHQ-9 Total Score: 0       The patient is  experiencing fatigue. Fatigue score: 5    PT/OT Functional Screening: PT fx screen : No needs identified  Speech Functional Screening: Speech fx screen : No needs identified  Rehab to be ordered: Rehab to be ordered : No needs identified        Result Review :  The following data was reviewed by: GILDA Kramer on 03/21/2024:  Lab Results   Component Value Date    HGB 10.4 (L) 03/21/2024    HCT 34.0 03/21/2024    MCV 66.3 (L) 03/21/2024     (H) 03/21/2024    WBC 5.50 03/21/2024    NEUTROABS 3.09 03/21/2024    NEUTROABS 2.97 03/21/2024    LYMPHSABS 1.83 03/21/2024    MONOSABS 0.41 03/21/2024    EOSABS 0.12 03/21/2024    EOSABS 0.11 03/21/2024    BASOSABS 0.04 03/21/2024    BASOSABS 0.11 03/21/2024     Lab Results   Component Value Date    GLUCOSE 131 (H) 03/21/2024    BUN 9 03/21/2024    CREATININE 0.83 03/21/2024     (L) 03/21/2024    K 3.4 (L) 03/21/2024     03/21/2024    CO2 23.9 03/21/2024    CALCIUM 8.9 03/21/2024    PROTEINTOT 7.2 03/21/2024    ALBUMIN 4.1 03/21/2024    BILITOT 0.3 03/21/2024    ALKPHOS 88 03/21/2024    AST 11 03/21/2024    ALT 9 03/21/2024     Lab Results  "  Component Value Date     03/21/2024    FERRITIN 10.77 (L) 03/21/2024     Lab Results   Component Value Date    IRON 39 03/21/2024    LABIRON 8 (L) 03/21/2024    TRANSFERRIN 312 03/21/2024    TIBC 465 03/21/2024     Lab Results   Component Value Date     03/21/2024    FERRITIN 10.77 (L) 03/21/2024     No results found for: \"PSA\", \"CEA\", \"AFP\", \"\", \"\"    XR Hand 3+ View Right    Result Date: 12/18/2023   Dorsal soft tissue swelling over the metacarpals.  No acute bony abnormality.      KLAUS MEZA DO       Electronically Signed and Approved By: KLAUS MEZA DO on 12/18/2023 at 13:33                    Assessment and Plan:  Diagnoses and all orders for this visit:    1. Microcytosis (Primary)  -     Iron Profile; Future  -     Ferritin; Future  -     CBC & Differential; Future  -     Peripheral Blood Smear; Future  -     Hemoglobinopathy Fractionation [place]; Future  -     Comprehensive Metabolic Panel; Future  -     Lactate Dehydrogenase; Future  -     Haptoglobin; Future  -     CBC & Differential; Future  -     Iron Profile; Future  -     Ferritin; Future    2. Chronic anemia  -     Iron Profile; Future  -     Ferritin; Future  -     CBC & Differential; Future  -     Peripheral Blood Smear; Future  -     Hemoglobinopathy Fractionation [place]; Future  -     Comprehensive Metabolic Panel; Future  -     Lactate Dehydrogenase; Future  -     Haptoglobin; Future  -     CBC & Differential; Future  -     Iron Profile; Future  -     Ferritin; Future    Microcytic anemia and thrombocytosis likely related to heavy menses.  No recent iron studies were noted. Unable to take oral iron due to severe diarrhea, GI upset. Will PA IV iron. Labs today with hemoglobin of 10.4, platelet count of 649, MCV of 66. Normal WBC of 5.50. Iron normal at 39, ferritin low at 10, iron sat of 8%, TIBC 465.     Follow up closely with GYN for heavy menses.     Discussed allergic type reactions that can happen with IV " iron and the need for some iron preparations to have pre-medications to avoid reactions. She agrees to proceed.         I spent 20 minutes caring for Marilee on this date of service. This time includes time spent by me in the following activities:preparing for the visit, reviewing tests, obtaining and/or reviewing a separately obtained history, performing a medically appropriate examination and/or evaluation , counseling and educating the patient/family/caregiver, ordering medications, tests, or procedures, referring and communicating with other health care professionals , documenting information in the medical record, independently interpreting results and communicating that information with the patient/family/caregiver, and care coordination    Patient Follow Up: 3 months with MD.     Patient was given instructions and counseling regarding her condition or for health maintenance advice. Please see specific information pulled into the AVS if appropriate.     Valencia Barakat, APRN    3/21/2024    .tob

## 2024-03-22 LAB
HGB A MFR BLD ELPH: NORMAL % (ref 96.4–98.8)
HGB A MFR BLD HPLC: 94.2 % (ref 96.4–98.8)
HGB A2 MFR BLD ELPH: 2.8 % (ref 1.8–3.2)
HGB A2 MFR BLD HPLC: ABNORMAL % (ref 1.8–3.2)
HGB C MFR BLD HPLC: 0 %
HGB E MFR BLD HPLC: 0 %
HGB F MFR BLD ELPH: NORMAL % (ref 0–2)
HGB F MFR BLD HPLC: 1.2 % (ref 0–2)
HGB FRACT BLD-IMP: ABNORMAL
HGB FRACT BLD-IMP: NORMAL
HGB OTHER MFR BLD HPLC: 1.8 %
HGB S MFR BLD ELPH: NORMAL %
HGB S MFR BLD HPLC: 0 %

## 2024-03-26 PROBLEM — K90.49 MALABSORPTION DUE TO INTOLERANCE, NOT ELSEWHERE CLASSIFIED: Status: ACTIVE | Noted: 2024-03-26

## 2024-04-01 ENCOUNTER — APPOINTMENT (OUTPATIENT)
Dept: CT IMAGING | Facility: HOSPITAL | Age: 39
End: 2024-04-01
Payer: MEDICAID

## 2024-04-01 ENCOUNTER — HOSPITAL ENCOUNTER (EMERGENCY)
Facility: HOSPITAL | Age: 39
Discharge: HOME | End: 2024-04-01
Attending: EMERGENCY MEDICINE | Admitting: EMERGENCY MEDICINE
Payer: MEDICAID

## 2024-04-01 VITALS
DIASTOLIC BLOOD PRESSURE: 97 MMHG | WEIGHT: 211.42 LBS | RESPIRATION RATE: 18 BRPM | TEMPERATURE: 98.7 F | SYSTOLIC BLOOD PRESSURE: 121 MMHG | HEIGHT: 62 IN | HEART RATE: 78 BPM | OXYGEN SATURATION: 100 % | BODY MASS INDEX: 38.91 KG/M2

## 2024-04-01 DIAGNOSIS — S00.83XA CONTUSION OF FOREHEAD, INITIAL ENCOUNTER: Primary | ICD-10-CM

## 2024-04-01 PROCEDURE — 25010000002 KETOROLAC TROMETHAMINE PER 15 MG: Performed by: EMERGENCY MEDICINE

## 2024-04-01 PROCEDURE — 96372 THER/PROPH/DIAG INJ SC/IM: CPT

## 2024-04-01 PROCEDURE — 99283 EMERGENCY DEPT VISIT LOW MDM: CPT

## 2024-04-01 RX ORDER — KETOROLAC TROMETHAMINE 30 MG/ML
30 INJECTION, SOLUTION INTRAMUSCULAR; INTRAVENOUS ONCE
Status: COMPLETED | OUTPATIENT
Start: 2024-04-01 | End: 2024-04-01

## 2024-04-01 RX ORDER — IBUPROFEN 800 MG/1
800 TABLET ORAL EVERY 8 HOURS PRN
Qty: 20 TABLET | Refills: 0 | Status: SHIPPED | OUTPATIENT
Start: 2024-04-01

## 2024-04-01 RX ORDER — HYDROCODONE BITARTRATE AND ACETAMINOPHEN 5; 325 MG/1; MG/1
1 TABLET ORAL ONCE
Status: COMPLETED | OUTPATIENT
Start: 2024-04-01 | End: 2024-04-01

## 2024-04-01 RX ORDER — HYDROCODONE BITARTRATE AND ACETAMINOPHEN 5; 325 MG/1; MG/1
1 TABLET ORAL EVERY 6 HOURS PRN
Qty: 8 TABLET | Refills: 0 | Status: SHIPPED | OUTPATIENT
Start: 2024-04-01

## 2024-04-01 RX ADMIN — HYDROCODONE BITARTRATE AND ACETAMINOPHEN 1 TABLET: 5; 325 TABLET ORAL at 14:57

## 2024-04-01 RX ADMIN — KETOROLAC TROMETHAMINE 30 MG: 30 INJECTION, SOLUTION INTRAMUSCULAR; INTRAVENOUS at 14:57

## 2024-04-01 NOTE — ED PROVIDER NOTES
Time: 2:50 PM EDT  Date of encounter:  4/1/2024  Independent Historian/Clinical History and Information was obtained by:   Patient    History is limited by: N/A    Chief Complaint: Head injury today      History of Present Illness:  Patient is a 38 y.o. year old female who presents to the emergency department for evaluation of for head injury today after she tripped on something and fell face first into the corner of the refrigerator injuring her forehead.    She now has a moderate-sized hematoma over the center of the forehead, and a second tiny hematoma just to the right of that.    She denies any loss of consciousness, no vomiting or lethargy or confusion or perseveration.  She is not on blood thinners.    She is just wanting some pain medicine for her head and was supposed to be at work today.    HPI    Patient Care Team  Primary Care Provider: Provider, No Known    Past Medical History:     Allergies   Allergen Reactions    Iron Glycinate Sulfate [Iron] Nausea And Vomiting     Iron sulfate     Past Medical History:   Diagnosis Date    Anemia     Asthma     Claustrophobia     Clotting disorder 2023    Deep vein thrombosis 2023    Fibroid     Ovarian cyst     PCOS (polycystic ovarian syndrome)     Polycystic ovary syndrome     Vaginal bleeding 10/03/2023     History reviewed. No pertinent surgical history.  Family History   Problem Relation Age of Onset    Diabetes Mother     Cancer Mother     Breast cancer Sister 55        1/2 sister    Ovarian cancer Neg Hx     Uterine cancer Neg Hx     Colon cancer Neg Hx     Deep vein thrombosis Neg Hx     Pulmonary embolism Neg Hx        Home Medications:  Prior to Admission medications    Medication Sig Start Date End Date Taking? Authorizing Provider   Calcium 500 MG tablet Take 500 mg by mouth Daily.  Patient not taking: Reported on 3/21/2024 2/6/24 4/1/24  Yoselin Fairbanks,    medroxyPROGESTERone (DEPO-PROVERA) 150 MG/ML injection Inject 1 mL into the appropriate muscle as  "directed by prescriber Every 3 (Three) Months.  Patient not taking: Reported on 3/21/2024 2/6/24 4/1/24  Yoselin Fairbanks DO   spironolactone (ALDACTONE) 50 MG tablet Take 1 tablet by mouth 2 (Two) Times a Day.  Patient not taking: Reported on 2024  Yoselin Fairbanks DO        Social History:   Social History     Tobacco Use    Smoking status: Some Days     Current packs/day: 0.00     Average packs/day: 0.3 packs/day for 7.1 years (1.8 ttl pk-yrs)     Types: Cigarettes     Start date: 2017     Last attempt to quit: 2024     Years since quittin.1     Passive exposure: Current    Smokeless tobacco: Never    Tobacco comments:     SMOKED 5 YEARS   Vaping Use    Vaping status: Never Used   Substance Use Topics    Alcohol use: Yes     Comment: OCCASIONALLY    Drug use: Never         Review of Systems:  Review of Systems   I performed a 10 point review of systems which was all negative, except for the positives found in the HPI above.      Physical Exam:  /97   Pulse 78   Temp 98.7 °F (37.1 °C) (Oral)   Resp 18   Ht 157.5 cm (62\")   Wt 95.9 kg (211 lb 6.7 oz)   LMP 2024 (Approximate)   SpO2 100%   BMI 38.67 kg/m²       Physical Exam   General: Awake alert and in mild to moderate distress due to forehead pain    HEENT: Head normocephalic and she has a moderate-sized 3 x 4 cm contusion or hematoma to the center of her forehead with no laceration or crepitus present, and also a much smaller 1 x 3 cm contusion parallel to this on forehead, eyes PERRLA EOMI with equal and briskly reactive pupils, nose normal, oropharynx normal.    Neck: Supple full range of motion, no meningismus, no C-spine tenderness to palpation    Heart: Regular rate and rhythm, no murmurs or rubs, 2+ radial pulses bilaterally    Lungs: Clear to auscultation bilaterally without wheezes or crackles, no respiratory distress    Abdomen: Soft, nontender, nondistended, no rebound or guarding    Skin: Warm, dry, no " rash    Musculoskeletal: Normal range of motion, no lower extremity edema    Neurologic: Oriented x3, no confusion, no motor deficits no sensory deficits    Psychiatric: Mood appears stable, no psychosis          Procedures:  Procedures      Medical Decision Making:      Comorbidities that affect care:    None    External Notes reviewed:    None      The following orders were placed and all results were independently analyzed by me:  No orders of the defined types were placed in this encounter.      Medications Given in the Emergency Department:  Medications   ketorolac (TORADOL) injection 30 mg (30 mg Intramuscular Given 4/1/24 1457)   HYDROcodone-acetaminophen (NORCO) 5-325 MG per tablet 1 tablet (1 tablet Oral Given 4/1/24 1457)        ED Course:         Labs:    Lab Results (last 24 hours)       ** No results found for the last 24 hours. **             Imaging:    No Radiology Exams Resulted Within Past 24 Hours      Differential Diagnosis and Discussion:    Differential diagnosis for this patient's head injury includes contusion, concussion, intracranial hemorrhage or hematoma, skull fracture.        MDM     This patient is a 38-year-old female who tripped and fell and hit her forehead on the refrigerator and now having pain from the forehead contusion.    She does have a headache but no loss of consciousness or vomiting or perseveration or lethargy or altered mental status and is not on blood thinners.    We talked about possibility of concussion or even intracranial bleed although very unlikely and I offered CT of the head but she declined and wants to be discharged due to prolonged wait times.    I will have the family reassess her multiple times a day and return her to the ER for CT of the head if she starts to act abnormally or episodes of vomiting or lethargy or perseveration.    Otherwise we will give her some pain meds and have supportive care instructions like ice packs Tylenol ibuprofen and work  note.                Patient Care Considerations:    CT HEAD: I considered ordering a noncontrast CT of the head, however patient declined and not having any signs or symptoms of concussion or intracranial hemorrhage today.      Consultants/Shared Management Plan:        Social Determinants of Health:    Patient has presented with family members who are responsible, reliable and will ensure follow up care.      Disposition and Care Coordination:    Discharged: The patient is suitable and stable for discharge with no need for consideration of admission.    I have explained the patient´s condition, diagnoses and treatment plan based on the information available to me at this time. I have answered questions and addressed any concerns. The patient has a good  understanding of the patient´s diagnosis, condition, and treatment plan as can be expected at this point. The vital signs have been stable. The patient´s condition is stable and appropriate for discharge from the emergency department.      The patient will pursue further outpatient evaluation with the primary care physician or other designated or consulting physician as outlined in the discharge instructions. They are agreeable to this plan of care and follow-up instructions have been explained in detail. The patient has received these instructions in written format and has expressed an understanding of the discharge instructions. The patient is aware that any significant change in condition or worsening of symptoms should prompt an immediate return to this or the closest emergency department or call to 911.  I have explained discharge medications and the need for follow up with the patient/caretakers. This was also printed in the discharge instructions. Patient was discharged with the following medications and follow up:      Medication List        New Prescriptions      HYDROcodone-acetaminophen 5-325 MG per tablet  Commonly known as: NORCO  Take 1 tablet by  mouth Every 6 (Six) Hours As Needed for Severe Pain.     ibuprofen 800 MG tablet  Commonly known as: ADVIL,MOTRIN  Take 1 tablet by mouth Every 8 (Eight) Hours As Needed for Moderate Pain or Headache.               Where to Get Your Medications        These medications were sent to [id number] - [place], [place] - [address] AT [facility] - [phone]  - [phone]   [address] [facility] [address]      Phone: [phone]   HYDROcodone-acetaminophen 5-325 MG per tablet  ibuprofen 800 MG tablet      [facility] EMERGENCY ROOM  [address] [facility] [address] [phone]  Go in 1 day  If symptoms worsen       Final diagnoses:   Contusion of forehead, initial encounter        ED Disposition       ED Disposition   Discharge    Condition   Stable    Comment   --               This medical record created using voice recognition software.             Fazal Pineda MD  04/01/24 [id number]

## 2024-04-01 NOTE — Clinical Note
[facility] EMERGENCY ROOM  [address] [place] [address]  Phone: [phone]  Fax: [phone]    Marilee Dunne was seen and treated in our emergency department on 4/1/2024.  She may return to work on 04/03/2024.         Thank you for choosing [facility].    Fazal Pineda MD

## 2024-04-01 NOTE — DISCHARGE INSTRUCTIONS
It looks like you just have a bad contusion or hematoma on your forehead from your injury today.    You can take Tylenol or ibuprofen and only take hydrocodone for the first day or 2 as needed and apply ice packs to your forehead when you get home.    If you start to have any worsening symptoms like severe headache or multiple episodes of vomiting or confusion or being coming very lethargic, have your family monitor you every few hours for today into this evening and have them return to the ER for CT of the head if you are getting any worse.

## 2024-04-04 ENCOUNTER — TELEPHONE (OUTPATIENT)
Dept: ONCOLOGY | Facility: HOSPITAL | Age: 39
End: 2024-04-04
Payer: MEDICAID

## 2024-04-04 NOTE — TELEPHONE ENCOUNTER
LEFT MESSAGE FOR PATIENT IN REGARDS TO SCHEDULING IRON INFUSION, ASKED FOR PATIENT TO CALL OFFICE BACK TO SCHEDULE.

## 2024-04-19 ENCOUNTER — TELEPHONE (OUTPATIENT)
Dept: ONCOLOGY | Facility: HOSPITAL | Age: 39
End: 2024-04-19
Payer: MEDICAID

## 2024-04-19 NOTE — TELEPHONE ENCOUNTER
MULTIPLE ATTEMPTS TO CONTACT PATIENT FOR IRON INFUSION SCHEDULING. ASKED FOR PATIENT TO RETURN CALL TO OFFICE FOR SCHEDULING.     PATIENT LETTER MAILED.

## 2024-04-24 RX ORDER — SODIUM CHLORIDE 9 MG/ML
20 INJECTION, SOLUTION INTRAVENOUS ONCE
OUTPATIENT
Start: 2024-05-02

## 2024-04-25 ENCOUNTER — HOSPITAL ENCOUNTER (OUTPATIENT)
Dept: ONCOLOGY | Facility: HOSPITAL | Age: 39
Discharge: HOME | End: 2024-04-25
Payer: MEDICAID

## 2024-04-25 VITALS
DIASTOLIC BLOOD PRESSURE: 87 MMHG | TEMPERATURE: 97.2 F | OXYGEN SATURATION: 100 % | SYSTOLIC BLOOD PRESSURE: 141 MMHG | HEART RATE: 78 BPM

## 2024-04-25 DIAGNOSIS — D75.839 THROMBOCYTOSIS: ICD-10-CM

## 2024-04-25 DIAGNOSIS — D50.0 IRON DEFICIENCY ANEMIA DUE TO CHRONIC BLOOD LOSS: ICD-10-CM

## 2024-04-25 DIAGNOSIS — K90.49 MALABSORPTION DUE TO INTOLERANCE, NOT ELSEWHERE CLASSIFIED: Primary | ICD-10-CM

## 2024-04-25 PROCEDURE — 25010000002 FERUMOXYTOL 510 MG/17ML SOLUTION: Performed by: NURSE PRACTITIONER

## 2024-04-25 PROCEDURE — 96374 THER/PROPH/DIAG INJ IV PUSH: CPT

## 2024-04-25 PROCEDURE — 25810000003 SODIUM CHLORIDE 0.9 % SOLUTION: Performed by: NURSE PRACTITIONER

## 2024-04-25 RX ORDER — SODIUM CHLORIDE 9 MG/ML
20 INJECTION, SOLUTION INTRAVENOUS ONCE
Status: COMPLETED | OUTPATIENT
Start: 2024-04-25 | End: 2024-04-25

## 2024-04-25 RX ADMIN — SODIUM CHLORIDE 20 ML/HR: 9 INJECTION, SOLUTION INTRAVENOUS at 14:22

## 2024-04-25 RX ADMIN — FERUMOXYTOL 510 MG: 510 INJECTION INTRAVENOUS at 14:25

## 2024-04-25 NOTE — NURSING NOTE
Ten mins into infusion, pt started to complain of nausea. Pt vomited about 100ml. Infusion stopped. APRN notified. Pt states she felt much better after vomiting. Pt had no other complaints. Per APRN, treatment to be completed for the day. VS taken after 30 mins per protocol. VS stable. Pt has no complaints. Zofran to be added to plan for next infusion.

## 2024-05-02 ENCOUNTER — HOSPITAL ENCOUNTER (OUTPATIENT)
Dept: ONCOLOGY | Facility: HOSPITAL | Age: 39
Discharge: HOME | End: 2024-05-02
Payer: MEDICAID

## 2024-05-02 VITALS
DIASTOLIC BLOOD PRESSURE: 77 MMHG | RESPIRATION RATE: 16 BRPM | SYSTOLIC BLOOD PRESSURE: 141 MMHG | OXYGEN SATURATION: 100 % | HEART RATE: 83 BPM | TEMPERATURE: 97.6 F

## 2024-05-02 DIAGNOSIS — D75.839 THROMBOCYTOSIS: ICD-10-CM

## 2024-05-02 DIAGNOSIS — K90.49 MALABSORPTION DUE TO INTOLERANCE, NOT ELSEWHERE CLASSIFIED: Primary | ICD-10-CM

## 2024-05-02 DIAGNOSIS — D50.0 IRON DEFICIENCY ANEMIA DUE TO CHRONIC BLOOD LOSS: ICD-10-CM

## 2024-05-02 PROCEDURE — 25010000002 FERUMOXYTOL 510 MG/17ML SOLUTION: Performed by: NURSE PRACTITIONER

## 2024-05-02 PROCEDURE — 96374 THER/PROPH/DIAG INJ IV PUSH: CPT

## 2024-05-02 PROCEDURE — 25010000002 ONDANSETRON PER 1 MG: Performed by: NURSE PRACTITIONER

## 2024-05-02 PROCEDURE — 25810000003 SODIUM CHLORIDE 0.9 % SOLUTION: Performed by: NURSE PRACTITIONER

## 2024-05-02 PROCEDURE — 96375 TX/PRO/DX INJ NEW DRUG ADDON: CPT

## 2024-05-02 RX ORDER — SODIUM CHLORIDE 9 MG/ML
20 INJECTION, SOLUTION INTRAVENOUS ONCE
Status: COMPLETED | OUTPATIENT
Start: 2024-05-02 | End: 2024-05-02

## 2024-05-02 RX ADMIN — ONDANSETRON 8 MG: 2 INJECTION INTRAMUSCULAR; INTRAVENOUS at 14:18

## 2024-05-02 RX ADMIN — SODIUM CHLORIDE 20 ML/HR: 9 INJECTION, SOLUTION INTRAVENOUS at 14:17

## 2024-05-02 RX ADMIN — FERUMOXYTOL 510 MG: 510 INJECTION INTRAVENOUS at 14:42

## 2024-05-09 ENCOUNTER — OFFICE VISIT (OUTPATIENT)
Dept: FAMILY MEDICINE CLINIC | Facility: CLINIC | Age: 39
End: 2024-05-09
Payer: MEDICAID

## 2024-05-09 VITALS
DIASTOLIC BLOOD PRESSURE: 70 MMHG | SYSTOLIC BLOOD PRESSURE: 126 MMHG | HEIGHT: 62 IN | BODY MASS INDEX: 42.69 KG/M2 | WEIGHT: 232 LBS | RESPIRATION RATE: 16 BRPM

## 2024-05-09 DIAGNOSIS — Z00.00 ANNUAL PHYSICAL EXAM: Primary | ICD-10-CM

## 2024-05-09 DIAGNOSIS — E66.01 MORBID OBESITY: ICD-10-CM

## 2024-05-09 DIAGNOSIS — Z79.899 MEDICATION MANAGEMENT: ICD-10-CM

## 2024-05-09 LAB
AMPHET+METHAMPHET UR QL: NEGATIVE
AMPHETAMINE INTERNAL CONTROL: NORMAL
AMPHETAMINES UR QL: NEGATIVE
BARBITURATE INTERNAL CONTROL: NORMAL
BARBITURATES UR QL SCN: NEGATIVE
BENZODIAZ UR QL SCN: NEGATIVE
BENZODIAZEPINE INTERNAL CONTROL: NORMAL
BUPRENORPHINE INTERNAL CONTROL: NORMAL
BUPRENORPHINE SERPL-MCNC: NEGATIVE NG/ML
CANNABINOIDS SERPL QL: NEGATIVE
COCAINE INTERNAL CONTROL: NORMAL
COCAINE UR QL: NEGATIVE
EXPIRATION DATE: NORMAL
Lab: NORMAL
MDMA (ECSTASY) INTERNAL CONTROL: NORMAL
MDMA UR QL SCN: NEGATIVE
METHADONE INTERNAL CONTROL: NORMAL
METHADONE UR QL SCN: NEGATIVE
METHAMPHETAMINE INTERNAL CONTROL: NORMAL
MORPHINE INTERNAL CONTROL: NORMAL
MORPHINE/OPIATES SCREEN, URINE: NEGATIVE
OXYCODONE INTERNAL CONTROL: NORMAL
OXYCODONE UR QL SCN: NEGATIVE
PCP UR QL SCN: NEGATIVE
PHENCYCLIDINE INTERNAL CONTROL: NORMAL
THC INTERNAL CONTROL: NORMAL

## 2024-05-09 PROCEDURE — 2014F MENTAL STATUS ASSESS: CPT | Performed by: STUDENT IN AN ORGANIZED HEALTH CARE EDUCATION/TRAINING PROGRAM

## 2024-05-09 PROCEDURE — 1160F RVW MEDS BY RX/DR IN RCRD: CPT | Performed by: STUDENT IN AN ORGANIZED HEALTH CARE EDUCATION/TRAINING PROGRAM

## 2024-05-09 PROCEDURE — 80305 DRUG TEST PRSMV DIR OPT OBS: CPT | Performed by: STUDENT IN AN ORGANIZED HEALTH CARE EDUCATION/TRAINING PROGRAM

## 2024-05-09 PROCEDURE — 1126F AMNT PAIN NOTED NONE PRSNT: CPT | Performed by: STUDENT IN AN ORGANIZED HEALTH CARE EDUCATION/TRAINING PROGRAM

## 2024-05-09 PROCEDURE — 1159F MED LIST DOCD IN RCRD: CPT | Performed by: STUDENT IN AN ORGANIZED HEALTH CARE EDUCATION/TRAINING PROGRAM

## 2024-05-09 PROCEDURE — 99395 PREV VISIT EST AGE 18-39: CPT | Performed by: STUDENT IN AN ORGANIZED HEALTH CARE EDUCATION/TRAINING PROGRAM

## 2024-05-09 RX ORDER — PHENTERMINE HYDROCHLORIDE 30 MG/1
30 CAPSULE ORAL EVERY MORNING
Qty: 30 CAPSULE | Refills: 1 | Status: SHIPPED | OUTPATIENT
Start: 2024-05-09

## 2024-06-25 ENCOUNTER — LAB (OUTPATIENT)
Dept: LAB | Facility: HOSPITAL | Age: 39
End: 2024-06-25
Payer: MEDICAID

## 2024-06-25 DIAGNOSIS — D50.0 IRON DEFICIENCY ANEMIA DUE TO CHRONIC BLOOD LOSS: Primary | ICD-10-CM

## 2024-06-25 LAB
ALBUMIN SERPL-MCNC: 4.1 G/DL (ref 3.5–5.2)
ALBUMIN/GLOB SERPL: 1.3 G/DL
ALP SERPL-CCNC: 87 U/L (ref 39–117)
ALT SERPL W P-5'-P-CCNC: 17 U/L (ref 1–33)
ANION GAP SERPL CALCULATED.3IONS-SCNC: 11.7 MMOL/L (ref 5–15)
AST SERPL-CCNC: 16 U/L (ref 1–32)
BASOPHILS # BLD AUTO: 0.03 10*3/MM3 (ref 0–0.2)
BASOPHILS NFR BLD AUTO: 0.5 % (ref 0–1.5)
BILIRUB SERPL-MCNC: 0.2 MG/DL (ref 0–1.2)
BUN SERPL-MCNC: 14 MG/DL (ref 6–20)
BUN/CREAT SERPL: 14.6 (ref 7–25)
CALCIUM SPEC-SCNC: 9.3 MG/DL (ref 8.6–10.5)
CHLORIDE SERPL-SCNC: 101 MMOL/L (ref 98–107)
CO2 SERPL-SCNC: 24.3 MMOL/L (ref 22–29)
CREAT SERPL-MCNC: 0.96 MG/DL (ref 0.57–1)
DEPRECATED RDW RBC AUTO: 49.1 FL (ref 37–54)
EGFRCR SERPLBLD CKD-EPI 2021: 78.8 ML/MIN/1.73
EOSINOPHIL # BLD AUTO: 0.15 10*3/MM3 (ref 0–0.4)
EOSINOPHIL NFR BLD AUTO: 2.6 % (ref 0.3–6.2)
ERYTHROCYTE [DISTWIDTH] IN BLOOD BY AUTOMATED COUNT: 20.2 % (ref 12.3–15.4)
FERRITIN SERPL-MCNC: 170 NG/ML (ref 13–150)
FOLATE SERPL-MCNC: 8.06 NG/ML (ref 4.78–24.2)
GLOBULIN UR ELPH-MCNC: 3.1 GM/DL
GLUCOSE SERPL-MCNC: 95 MG/DL (ref 65–99)
HCT VFR BLD AUTO: 42 % (ref 34–46.6)
HGB BLD-MCNC: 12.8 G/DL (ref 12–15.9)
IMM GRANULOCYTES # BLD AUTO: 0.02 10*3/MM3 (ref 0–0.05)
IMM GRANULOCYTES NFR BLD AUTO: 0.3 % (ref 0–0.5)
IRON 24H UR-MRATE: 55 MCG/DL (ref 37–145)
IRON SATN MFR SERPL: 16 % (ref 20–50)
LYMPHOCYTES # BLD AUTO: 2.11 10*3/MM3 (ref 0.7–3.1)
LYMPHOCYTES NFR BLD AUTO: 36.2 % (ref 19.6–45.3)
MCH RBC QN AUTO: 21.7 PG (ref 26.6–33)
MCHC RBC AUTO-ENTMCNC: 30.5 G/DL (ref 31.5–35.7)
MCV RBC AUTO: 71.2 FL (ref 79–97)
MICROCYTES BLD QL: NORMAL
MONOCYTES # BLD AUTO: 0.44 10*3/MM3 (ref 0.1–0.9)
MONOCYTES NFR BLD AUTO: 7.5 % (ref 5–12)
NEUTROPHILS NFR BLD AUTO: 3.08 10*3/MM3 (ref 1.7–7)
NEUTROPHILS NFR BLD AUTO: 52.9 % (ref 42.7–76)
NRBC BLD AUTO-RTO: 0 /100 WBC (ref 0–0.2)
PLATELET # BLD AUTO: 412 10*3/MM3 (ref 140–450)
PMV BLD AUTO: 10.8 FL (ref 6–12)
POTASSIUM SERPL-SCNC: 4.2 MMOL/L (ref 3.5–5.2)
PROT SERPL-MCNC: 7.2 G/DL (ref 6–8.5)
RBC # BLD AUTO: 5.9 10*6/MM3 (ref 3.77–5.28)
SMALL PLATELETS BLD QL SMEAR: ADEQUATE
SODIUM SERPL-SCNC: 137 MMOL/L (ref 136–145)
TIBC SERPL-MCNC: 350 MCG/DL (ref 298–536)
TRANSFERRIN SERPL-MCNC: 235 MG/DL (ref 200–360)
VIT B12 BLD-MCNC: >2000 PG/ML (ref 211–946)
WBC MORPH BLD: NORMAL
WBC NRBC COR # BLD AUTO: 5.83 10*3/MM3 (ref 3.4–10.8)

## 2024-06-25 PROCEDURE — 82607 VITAMIN B-12: CPT | Performed by: NURSE PRACTITIONER

## 2024-06-25 PROCEDURE — 36415 COLL VENOUS BLD VENIPUNCTURE: CPT | Performed by: NURSE PRACTITIONER

## 2024-06-25 PROCEDURE — 83540 ASSAY OF IRON: CPT | Performed by: NURSE PRACTITIONER

## 2024-06-25 PROCEDURE — 85025 COMPLETE CBC W/AUTO DIFF WBC: CPT | Performed by: NURSE PRACTITIONER

## 2024-06-25 PROCEDURE — 85007 BL SMEAR W/DIFF WBC COUNT: CPT | Performed by: NURSE PRACTITIONER

## 2024-06-25 PROCEDURE — 84466 ASSAY OF TRANSFERRIN: CPT | Performed by: NURSE PRACTITIONER

## 2024-06-25 PROCEDURE — 82746 ASSAY OF FOLIC ACID SERUM: CPT | Performed by: NURSE PRACTITIONER

## 2024-06-25 PROCEDURE — 82728 ASSAY OF FERRITIN: CPT | Performed by: NURSE PRACTITIONER

## 2024-06-25 PROCEDURE — 80053 COMPREHEN METABOLIC PANEL: CPT | Performed by: NURSE PRACTITIONER

## 2024-06-26 ENCOUNTER — OFFICE VISIT (OUTPATIENT)
Dept: ONCOLOGY | Facility: HOSPITAL | Age: 39
End: 2024-06-26
Payer: MEDICAID

## 2024-06-26 VITALS
WEIGHT: 232 LBS | OXYGEN SATURATION: 100 % | DIASTOLIC BLOOD PRESSURE: 96 MMHG | HEART RATE: 86 BPM | BODY MASS INDEX: 42.43 KG/M2 | TEMPERATURE: 98 F | SYSTOLIC BLOOD PRESSURE: 144 MMHG | RESPIRATION RATE: 16 BRPM

## 2024-06-26 DIAGNOSIS — D56.3 BETA THALASSEMIA MINOR: ICD-10-CM

## 2024-06-26 DIAGNOSIS — D50.0 IRON DEFICIENCY ANEMIA DUE TO CHRONIC BLOOD LOSS: Primary | ICD-10-CM

## 2024-06-26 DIAGNOSIS — R71.8 MICROCYTOSIS: ICD-10-CM

## 2024-06-26 PROCEDURE — 1160F RVW MEDS BY RX/DR IN RCRD: CPT | Performed by: NURSE PRACTITIONER

## 2024-06-26 PROCEDURE — 99214 OFFICE O/P EST MOD 30 MIN: CPT | Performed by: NURSE PRACTITIONER

## 2024-06-26 PROCEDURE — G0463 HOSPITAL OUTPT CLINIC VISIT: HCPCS | Performed by: NURSE PRACTITIONER

## 2024-06-26 PROCEDURE — 1159F MED LIST DOCD IN RCRD: CPT | Performed by: NURSE PRACTITIONER

## 2024-06-26 PROCEDURE — 1125F AMNT PAIN NOTED PAIN PRSNT: CPT | Performed by: NURSE PRACTITIONER

## 2024-06-26 NOTE — PROGRESS NOTES
Chief Complaint/Reason for Referral:  thromboytosis    Donya Epstein MD Patel, Jahin, MD R    Subjective    History of Present Illness    Ms. Marilee Dunne presents for follow up after receiving iron infusions back in April and early May with 2 doses of Fereheme. She also has microcytosis and was tested for hemoglobin electrophoresis and was found to have beta thalassemia minor as well, so this will explain her low MCV with the thalassemia. She is unable to take oral iron due to GI distress.   She reports the fatigue has improved.     Reports menses are no longer heavy.     Repeat lab work on 6/25/2024 shows iron normal at 55, ferritin improved from 10 to 170, and iron sat improved from 8% to 16%. Folate and B-12 are in acceptable range. Hemoglobin has improved from 10.4 to normal range of 12.8.       Oncology/Hematology History    No history exists.       Review of Systems   Constitutional:  Positive for fatigue.   HENT: Negative.     Eyes: Negative.    Respiratory: Negative.     Cardiovascular: Negative.    Gastrointestinal: Negative.    Endocrine: Negative.    Genitourinary: Negative.    Musculoskeletal: Negative.    Skin: Negative.    Allergic/Immunologic: Negative.    Neurological: Negative.    Hematological: Negative.    Psychiatric/Behavioral: Negative.         Current Outpatient Medications on File Prior to Visit   Medication Sig Dispense Refill    phentermine 30 MG capsule Take 1 capsule by mouth Every Morning. (Patient not taking: Reported on 6/26/2024) 30 capsule 1     No current facility-administered medications on file prior to visit.       Allergies   Allergen Reactions    Iron Glycinate Sulfate [Iron] Nausea And Vomiting     Iron sulfate     Past Medical History:   Diagnosis Date    Anemia     Asthma     Claustrophobia     Clotting disorder 2023    Deep vein thrombosis 2023    Fibroid     Ovarian cyst     PCOS (polycystic ovarian syndrome)     Polycystic ovary syndrome     Vaginal bleeding  10/03/2023     History reviewed. No pertinent surgical history.  Social History     Socioeconomic History    Marital status: Single   Tobacco Use    Smoking status: Some Days     Current packs/day: 0.00     Average packs/day: 0.3 packs/day for 7.1 years (1.8 ttl pk-yrs)     Types: Cigarettes     Start date: 2017     Last attempt to quit: 2024     Years since quittin.4     Passive exposure: Current    Smokeless tobacco: Never    Tobacco comments:     SMOKED 5 YEARS   Vaping Use    Vaping status: Never Used   Substance and Sexual Activity    Alcohol use: Yes     Comment: OCCASIONALLY    Drug use: Never    Sexual activity: Yes     Partners: Female     Birth control/protection: Same-sex partner     Family History   Problem Relation Age of Onset    Diabetes Mother     Cancer Mother     Breast cancer Sister 55        1/2 sister    Ovarian cancer Neg Hx     Uterine cancer Neg Hx     Colon cancer Neg Hx     Deep vein thrombosis Neg Hx     Pulmonary embolism Neg Hx      Immunization History   Administered Date(s) Administered    COVID-19 (MODERNA) 1st,2nd,3rd Dose Monovalent 2021, 2021    Tdap 2021       Tobacco Use: High Risk (2024)    Patient History     Smoking Tobacco Use: Some Days     Smokeless Tobacco Use: Never     Passive Exposure: Current       Objective     Physical Exam  Vitals and nursing note reviewed.   Constitutional:       Appearance: Normal appearance. She is obese.   HENT:      Head: Normocephalic.      Nose: Nose normal.      Mouth/Throat:      Mouth: Mucous membranes are moist.   Eyes:      Pupils: Pupils are equal, round, and reactive to light.   Cardiovascular:      Rate and Rhythm: Normal rate and regular rhythm.      Pulses: Normal pulses.      Heart sounds: Normal heart sounds.   Pulmonary:      Effort: Pulmonary effort is normal. No respiratory distress.      Breath sounds: Normal breath sounds.   Abdominal:      General: Bowel sounds are normal. There is no  distension.      Palpations: Abdomen is soft.   Musculoskeletal:         General: Normal range of motion.      Cervical back: Normal range of motion and neck supple.   Skin:     General: Skin is warm and dry.      Capillary Refill: Capillary refill takes less than 2 seconds.   Neurological:      General: No focal deficit present.      Mental Status: She is alert and oriented to person, place, and time.   Psychiatric:         Mood and Affect: Mood normal.         Behavior: Behavior normal.         Thought Content: Thought content normal.         Judgment: Judgment normal.         Vitals:    06/26/24 1456   BP: 144/96   Pulse: 86   Resp: 16   Temp: 98 °F (36.7 °C)   SpO2: 100%   Weight: 105 kg (232 lb)   PainSc:   2   PainLoc: Foot       Wt Readings from Last 3 Encounters:   06/26/24 105 kg (232 lb)   05/09/24 105 kg (232 lb)   04/01/24 95.9 kg (211 lb 6.7 oz)                 ECOG score: 0         ECOG: (0) Fully Active - Able to Carry On All Pre-disease Performance Without Restriction  Fall Risk Assessment was completed, and patient is at low risk for falls.  PHQ-9 Total Score: 0       The patient is  experiencing fatigue. Fatigue score: 2    PT/OT Functional Screening: PT fx screen : No needs identified  Speech Functional Screening: Speech fx screen : No needs identified  Rehab to be ordered: Rehab to be ordered : No needs identified        Result Review :  The following data was reviewed by: GILDA Kramer on 06/26/2024:  Lab Results   Component Value Date    HGB 12.8 06/25/2024    HCT 42.0 06/25/2024    MCV 71.2 (L) 06/25/2024     06/25/2024    WBC 5.83 06/25/2024    NEUTROABS 3.08 06/25/2024    LYMPHSABS 2.11 06/25/2024    MONOSABS 0.44 06/25/2024    EOSABS 0.15 06/25/2024    BASOSABS 0.03 06/25/2024     Lab Results   Component Value Date    GLUCOSE 95 06/25/2024    BUN 14 06/25/2024    CREATININE 0.96 06/25/2024     06/25/2024    K 4.2 06/25/2024     06/25/2024    CO2 24.3  "06/25/2024    CALCIUM 9.3 06/25/2024    PROTEINTOT 7.2 06/25/2024    ALBUMIN 4.1 06/25/2024    BILITOT 0.2 06/25/2024    ALKPHOS 87 06/25/2024    AST 16 06/25/2024    ALT 17 06/25/2024     Lab Results   Component Value Date     03/21/2024    FERRITIN 170.00 (H) 06/25/2024    [id number] >2,000 (H) 06/25/2024    FOLATE 8.06 06/25/2024     Lab Results   Component Value Date    IRON 55 06/25/2024    LABIRON 16 (L) 06/25/2024    TRANSFERRIN 235 06/25/2024    TIBC 350 06/25/2024     Lab Results   Component Value Date     03/21/2024    FERRITIN 170.00 (H) 06/25/2024    PJMJGMJJ68 >2,000 (H) 06/25/2024    FOLATE 8.06 06/25/2024     No results found for: \"PSA\", \"CEA\", \"AFP\", \"\", \"\"    No Images in the past 120 days found..         Assessment and Plan:  Diagnoses and all orders for this visit:    1. Iron deficiency anemia due to chronic blood loss (Primary)  -     CBC & Differential; Future  -     Iron Profile; Future  -     Ferritin; Future    2. Beta thalassemia minor    3. Microcytosis    Microcytic anemia. Hemoglobin electrophoresis showed beta thalassemia minor which explains her low MCV.     Iron deficiency anemia first noted in march of 2024 and treated with Fereheme x 2 dose in April and May. Repeat iron studies improved and now with normal hemoglobin. Reports prior blood transfusion due to heavy menses in the past, now on Depo injection.     Plan to repeat Iron studies, ferritin, CBC in 3 months to see if additional IV iron is needed.     Unable to take oral iron due to GI distress.         I spent 20 minutes caring for Marilee on this date of service. This time includes time spent by me in the following activities:preparing for the visit, reviewing tests, obtaining and/or reviewing a separately obtained history, performing a medically appropriate examination and/or evaluation , counseling and educating the patient/family/caregiver, ordering medications, tests, or procedures, referring and " communicating with other health care professionals , documenting information in the medical record, and independently interpreting results and communicating that information with the patient/family/caregiver    Patient Follow Up: 3 months with NP.     Patient was given instructions and counseling regarding her condition or for health maintenance advice. Please see specific information pulled into the AVS if appropriate.     Valencia Barakat, APRN    6/26/2024    .tob

## 2024-06-28 ENCOUNTER — TELEPHONE (OUTPATIENT)
Dept: OBSTETRICS AND GYNECOLOGY | Facility: CLINIC | Age: 39
End: 2024-06-28
Payer: MEDICAID

## 2024-07-08 ENCOUNTER — TELEPHONE (OUTPATIENT)
Dept: FAMILY MEDICINE CLINIC | Facility: CLINIC | Age: 39
End: 2024-07-08
Payer: MEDICAID

## 2024-08-01 ENCOUNTER — TELEPHONE (OUTPATIENT)
Dept: FAMILY MEDICINE CLINIC | Facility: CLINIC | Age: 39
End: 2024-08-01

## 2024-08-01 NOTE — TELEPHONE ENCOUNTER
LVMTCB    Patient missed their appointment scheduled on 8/1/24 with     Would patient like to be rescheduled?    No show letter sent to patient either via [employer] or mail.     HUB TO SHARE AND RELAY

## 2024-11-04 ENCOUNTER — TELEPHONE (OUTPATIENT)
Dept: ONCOLOGY | Facility: HOSPITAL | Age: 39
End: 2024-11-04
Payer: MEDICAID

## 2024-11-04 NOTE — TELEPHONE ENCOUNTER
Called and spoke to pt.  Asked pt if she would like to r/s her missed appointment.  Pt states that she is currently having insurance issues and will call when she gets that fixed.

## 2025-08-29 ENCOUNTER — HOSPITAL ENCOUNTER (EMERGENCY)
Facility: HOSPITAL | Age: 40
Discharge: HOME | End: 2025-08-30
Attending: EMERGENCY MEDICINE

## 2025-08-29 ENCOUNTER — APPOINTMENT (OUTPATIENT)
Dept: GENERAL RADIOLOGY | Facility: HOSPITAL | Age: 40
End: 2025-08-29

## 2025-08-29 VITALS
TEMPERATURE: 98.6 F | OXYGEN SATURATION: 100 % | WEIGHT: 226.41 LBS | RESPIRATION RATE: 18 BRPM | SYSTOLIC BLOOD PRESSURE: 118 MMHG | HEIGHT: 61 IN | DIASTOLIC BLOOD PRESSURE: 62 MMHG | BODY MASS INDEX: 42.75 KG/M2 | HEART RATE: 75 BPM

## 2025-08-29 DIAGNOSIS — B34.9 VIRAL SYNDROME: Primary | ICD-10-CM

## 2025-08-29 LAB
FLUAV RNA RESP QL NAA+PROBE: NOT DETECTED
FLUBV RNA NPH QL NAA+NON-PROBE: NOT DETECTED
RSV RNA RESP QL NAA+PROBE: NOT DETECTED
S PYO AG THROAT QL: NEGATIVE
SARS-COV-2 RNA RESP QL NAA+PROBE: NOT DETECTED

## 2025-08-29 PROCEDURE — 87880 STREP A ASSAY W/OPTIC: CPT | Performed by: EMERGENCY MEDICINE

## 2025-08-29 PROCEDURE — 87081 CULTURE SCREEN ONLY: CPT | Performed by: EMERGENCY MEDICINE

## 2025-08-29 PROCEDURE — 71045 X-RAY EXAM CHEST 1 VIEW: CPT

## 2025-08-29 PROCEDURE — 87637 SARSCOV2&INF A&B&RSV AMP PRB: CPT | Performed by: EMERGENCY MEDICINE

## 2025-08-29 RX ORDER — BENZONATATE 200 MG/1
200 CAPSULE ORAL 3 TIMES DAILY PRN
Qty: 20 CAPSULE | Refills: 0 | Status: SHIPPED | OUTPATIENT
Start: 2025-08-29